# Patient Record
Sex: FEMALE | Race: WHITE | Employment: PART TIME | URBAN - METROPOLITAN AREA
[De-identification: names, ages, dates, MRNs, and addresses within clinical notes are randomized per-mention and may not be internally consistent; named-entity substitution may affect disease eponyms.]

---

## 2017-04-12 PROBLEM — L03.032 PARONYCHIA OF GREAT TOE, LEFT: Status: ACTIVE | Noted: 2017-04-12

## 2019-02-07 ENCOUNTER — HOSPITAL ENCOUNTER (INPATIENT)
Facility: HOSPITAL | Age: 49
LOS: 2 days | Discharge: HOME | DRG: 419 | End: 2019-02-09
Attending: HOSPITALIST | Admitting: HOSPITALIST
Payer: COMMERCIAL

## 2019-02-07 DIAGNOSIS — K81.0 ACUTE CHOLECYSTITIS: ICD-10-CM

## 2019-02-07 PROBLEM — K81.9 CHOLECYSTITIS: Status: ACTIVE | Noted: 2019-02-07

## 2019-02-07 RX ORDER — MORPHINE SULFATE 4 MG/ML
1 INJECTION, SOLUTION INTRAMUSCULAR; INTRAVENOUS EVERY 2 HOUR PRN
Status: DISCONTINUED | OUTPATIENT
Start: 2019-02-07 | End: 2019-02-08 | Stop reason: HOSPADM

## 2019-02-07 RX ORDER — MORPHINE SULFATE 4 MG/ML
2 INJECTION, SOLUTION INTRAMUSCULAR; INTRAVENOUS EVERY 2 HOUR PRN
Status: DISCONTINUED | OUTPATIENT
Start: 2019-02-07 | End: 2019-02-08 | Stop reason: HOSPADM

## 2019-02-07 RX ORDER — MORPHINE SULFATE 4 MG/ML
4 INJECTION, SOLUTION INTRAMUSCULAR; INTRAVENOUS EVERY 2 HOUR PRN
Status: DISCONTINUED | OUTPATIENT
Start: 2019-02-07 | End: 2019-02-08 | Stop reason: HOSPADM

## 2019-02-07 RX ORDER — ACETAMINOPHEN 325 MG/1
650 TABLET ORAL EVERY 6 HOURS PRN
Status: DISCONTINUED | OUTPATIENT
Start: 2019-02-07 | End: 2019-02-08 | Stop reason: HOSPADM

## 2019-02-07 RX ORDER — METOCLOPRAMIDE HYDROCHLORIDE 5 MG/ML
10 INJECTION INTRAMUSCULAR; INTRAVENOUS EVERY 8 HOURS PRN
Status: DISCONTINUED | OUTPATIENT
Start: 2019-02-07 | End: 2019-02-08 | Stop reason: HOSPADM

## 2019-02-07 RX ORDER — SODIUM CHLORIDE 9 MG/ML
INJECTION, SOLUTION INTRAVENOUS CONTINUOUS
Status: DISCONTINUED | OUTPATIENT
Start: 2019-02-07 | End: 2019-02-08 | Stop reason: HOSPADM

## 2019-02-07 RX ORDER — ONDANSETRON 2 MG/ML
4 INJECTION INTRAMUSCULAR; INTRAVENOUS EVERY 6 HOURS PRN
Status: DISCONTINUED | OUTPATIENT
Start: 2019-02-07 | End: 2019-02-08 | Stop reason: HOSPADM

## 2019-02-08 ENCOUNTER — ANESTHESIA EVENT (OUTPATIENT)
Dept: SURGERY | Facility: HOSPITAL | Age: 49
DRG: 419 | End: 2019-02-08
Payer: COMMERCIAL

## 2019-02-08 ENCOUNTER — APPOINTMENT (OUTPATIENT)
Dept: MRI IMAGING | Facility: HOSPITAL | Age: 49
DRG: 419 | End: 2019-02-08
Attending: INTERNAL MEDICINE
Payer: COMMERCIAL

## 2019-02-08 ENCOUNTER — ANESTHESIA (OUTPATIENT)
Dept: SURGERY | Facility: HOSPITAL | Age: 49
DRG: 419 | End: 2019-02-08
Payer: COMMERCIAL

## 2019-02-08 LAB
ALBUMIN SERPL-MCNC: 2.9 G/DL (ref 3.1–4.5)
ALBUMIN/GLOB SERPL: 0.9 {RATIO} (ref 1–2)
ALP LIVER SERPL-CCNC: 101 U/L (ref 39–100)
ALT SERPL-CCNC: 54 U/L (ref 14–54)
ANION GAP SERPL CALC-SCNC: 5 MMOL/L (ref 0–18)
AST SERPL-CCNC: 54 U/L (ref 15–41)
BASOPHILS # BLD AUTO: 0.02 X10(3) UL (ref 0–0.2)
BASOPHILS NFR BLD AUTO: 0.2 %
BILIRUB SERPL-MCNC: 1.4 MG/DL (ref 0.1–2)
BUN BLD-MCNC: 8 MG/DL (ref 8–20)
BUN/CREAT SERPL: 13.1 (ref 10–20)
CALCIUM BLD-MCNC: 8.2 MG/DL (ref 8.3–10.3)
CHLORIDE SERPL-SCNC: 111 MMOL/L (ref 101–111)
CO2 SERPL-SCNC: 24 MMOL/L (ref 22–32)
CREAT BLD-MCNC: 0.61 MG/DL (ref 0.55–1.02)
DEPRECATED RDW RBC AUTO: 44 FL (ref 35.1–46.3)
EOSINOPHIL # BLD AUTO: 0.11 X10(3) UL (ref 0–0.7)
EOSINOPHIL NFR BLD AUTO: 1 %
ERYTHROCYTE [DISTWIDTH] IN BLOOD BY AUTOMATED COUNT: 12.6 % (ref 11–15)
GLOBULIN PLAS-MCNC: 3.2 G/DL (ref 2.8–4.4)
GLUCOSE BLD-MCNC: 109 MG/DL (ref 70–99)
HCT VFR BLD AUTO: 36.5 % (ref 35–48)
HGB BLD-MCNC: 12.1 G/DL (ref 12–16)
IMM GRANULOCYTES # BLD AUTO: 0.06 X10(3) UL (ref 0–1)
IMM GRANULOCYTES NFR BLD: 0.5 %
LACTIC ACID: 0.8 MMOL/L (ref 0.5–2)
LYMPHOCYTES # BLD AUTO: 1.13 X10(3) UL (ref 1–4)
LYMPHOCYTES NFR BLD AUTO: 10.3 %
M PROTEIN MFR SERPL ELPH: 6.1 G/DL (ref 6.4–8.2)
MCH RBC QN AUTO: 32.1 PG (ref 26–34)
MCHC RBC AUTO-ENTMCNC: 33.2 G/DL (ref 31–37)
MCV RBC AUTO: 96.8 FL (ref 80–100)
MONOCYTES # BLD AUTO: 1 X10(3) UL (ref 0.1–1)
MONOCYTES NFR BLD AUTO: 9.1 %
NEUTROPHILS # BLD AUTO: 8.7 X10 (3) UL (ref 1.5–7.7)
NEUTROPHILS # BLD AUTO: 8.7 X10(3) UL (ref 1.5–7.7)
NEUTROPHILS NFR BLD AUTO: 78.9 %
OSMOLALITY SERPL CALC.SUM OF ELEC: 289 MOSM/KG (ref 275–295)
PLATELET # BLD AUTO: 189 10(3)UL (ref 150–450)
POTASSIUM SERPL-SCNC: 3.6 MMOL/L (ref 3.6–5.1)
POTASSIUM SERPL-SCNC: 3.6 MMOL/L (ref 3.6–5.1)
RBC # BLD AUTO: 3.77 X10(6)UL (ref 3.8–5.3)
SODIUM SERPL-SCNC: 140 MMOL/L (ref 136–144)
WBC # BLD AUTO: 11 X10(3) UL (ref 4–11)

## 2019-02-08 PROCEDURE — 88304 TISSUE EXAM BY PATHOLOGIST: CPT | Performed by: SURGERY

## 2019-02-08 PROCEDURE — 74183 MRI ABD W/O CNTR FLWD CNTR: CPT | Performed by: INTERNAL MEDICINE

## 2019-02-08 PROCEDURE — 85025 COMPLETE CBC W/AUTO DIFF WBC: CPT | Performed by: INTERNAL MEDICINE

## 2019-02-08 PROCEDURE — 76376 3D RENDER W/INTRP POSTPROCES: CPT | Performed by: INTERNAL MEDICINE

## 2019-02-08 PROCEDURE — 87040 BLOOD CULTURE FOR BACTERIA: CPT | Performed by: HOSPITALIST

## 2019-02-08 PROCEDURE — 0FT44ZZ RESECTION OF GALLBLADDER, PERCUTANEOUS ENDOSCOPIC APPROACH: ICD-10-PCS | Performed by: SURGERY

## 2019-02-08 PROCEDURE — A9575 INJ GADOTERATE MEGLUMI 0.1ML: HCPCS | Performed by: HOSPITALIST

## 2019-02-08 PROCEDURE — 80053 COMPREHEN METABOLIC PANEL: CPT | Performed by: INTERNAL MEDICINE

## 2019-02-08 PROCEDURE — 84132 ASSAY OF SERUM POTASSIUM: CPT | Performed by: HOSPITALIST

## 2019-02-08 PROCEDURE — 83605 ASSAY OF LACTIC ACID: CPT | Performed by: HOSPITALIST

## 2019-02-08 RX ORDER — SODIUM CHLORIDE, SODIUM LACTATE, POTASSIUM CHLORIDE, CALCIUM CHLORIDE 600; 310; 30; 20 MG/100ML; MG/100ML; MG/100ML; MG/100ML
INJECTION, SOLUTION INTRAVENOUS CONTINUOUS
Status: DISCONTINUED | OUTPATIENT
Start: 2019-02-08 | End: 2019-02-08

## 2019-02-08 RX ORDER — HYDROCODONE BITARTRATE AND ACETAMINOPHEN 5; 325 MG/1; MG/1
1 TABLET ORAL EVERY 4 HOURS PRN
Status: DISCONTINUED | OUTPATIENT
Start: 2019-02-08 | End: 2019-02-09

## 2019-02-08 RX ORDER — HYDROMORPHONE HYDROCHLORIDE 1 MG/ML
0.4 INJECTION, SOLUTION INTRAMUSCULAR; INTRAVENOUS; SUBCUTANEOUS EVERY 2 HOUR PRN
Status: DISCONTINUED | OUTPATIENT
Start: 2019-02-08 | End: 2019-02-09

## 2019-02-08 RX ORDER — HYDROMORPHONE HYDROCHLORIDE 1 MG/ML
INJECTION, SOLUTION INTRAMUSCULAR; INTRAVENOUS; SUBCUTANEOUS
Status: COMPLETED
Start: 2019-02-08 | End: 2019-02-08

## 2019-02-08 RX ORDER — LORAZEPAM 1 MG/1
1 TABLET ORAL ONCE AS NEEDED
Status: COMPLETED | OUTPATIENT
Start: 2019-02-08 | End: 2019-02-08

## 2019-02-08 RX ORDER — ONDANSETRON 2 MG/ML
4 INJECTION INTRAMUSCULAR; INTRAVENOUS AS NEEDED
Status: DISCONTINUED | OUTPATIENT
Start: 2019-02-08 | End: 2019-02-08 | Stop reason: HOSPADM

## 2019-02-08 RX ORDER — HYDROMORPHONE HYDROCHLORIDE 1 MG/ML
1.2 INJECTION, SOLUTION INTRAMUSCULAR; INTRAVENOUS; SUBCUTANEOUS EVERY 2 HOUR PRN
Status: DISCONTINUED | OUTPATIENT
Start: 2019-02-08 | End: 2019-02-09

## 2019-02-08 RX ORDER — HEPARIN SODIUM 5000 [USP'U]/ML
5000 INJECTION, SOLUTION INTRAVENOUS; SUBCUTANEOUS EVERY 12 HOURS SCHEDULED
Status: DISCONTINUED | OUTPATIENT
Start: 2019-02-08 | End: 2019-02-09

## 2019-02-08 RX ORDER — KETOROLAC TROMETHAMINE 15 MG/ML
15 INJECTION, SOLUTION INTRAMUSCULAR; INTRAVENOUS EVERY 6 HOURS PRN
Status: DISCONTINUED | OUTPATIENT
Start: 2019-02-08 | End: 2019-02-09

## 2019-02-08 RX ORDER — ACETAMINOPHEN 10 MG/ML
INJECTION, SOLUTION INTRAVENOUS
Status: DISCONTINUED | OUTPATIENT
Start: 2019-02-08 | End: 2019-02-08 | Stop reason: HOSPADM

## 2019-02-08 RX ORDER — HYDROMORPHONE HYDROCHLORIDE 1 MG/ML
0.8 INJECTION, SOLUTION INTRAMUSCULAR; INTRAVENOUS; SUBCUTANEOUS EVERY 2 HOUR PRN
Status: DISCONTINUED | OUTPATIENT
Start: 2019-02-08 | End: 2019-02-09

## 2019-02-08 RX ORDER — DEXAMETHASONE SODIUM PHOSPHATE 4 MG/ML
4 VIAL (ML) INJECTION AS NEEDED
Status: DISCONTINUED | OUTPATIENT
Start: 2019-02-08 | End: 2019-02-08 | Stop reason: HOSPADM

## 2019-02-08 RX ORDER — NALOXONE HYDROCHLORIDE 0.4 MG/ML
80 INJECTION, SOLUTION INTRAMUSCULAR; INTRAVENOUS; SUBCUTANEOUS AS NEEDED
Status: ACTIVE | OUTPATIENT
Start: 2019-02-08 | End: 2019-02-09

## 2019-02-08 RX ORDER — KETOROLAC TROMETHAMINE 30 MG/ML
30 INJECTION, SOLUTION INTRAMUSCULAR; INTRAVENOUS EVERY 6 HOURS PRN
Status: DISCONTINUED | OUTPATIENT
Start: 2019-02-08 | End: 2019-02-09

## 2019-02-08 RX ORDER — ONDANSETRON 2 MG/ML
4 INJECTION INTRAMUSCULAR; INTRAVENOUS EVERY 6 HOURS PRN
Status: DISCONTINUED | OUTPATIENT
Start: 2019-02-08 | End: 2019-02-09

## 2019-02-08 RX ORDER — BUPIVACAINE HYDROCHLORIDE AND EPINEPHRINE 5; 5 MG/ML; UG/ML
INJECTION, SOLUTION EPIDURAL; INTRACAUDAL; PERINEURAL AS NEEDED
Status: DISCONTINUED | OUTPATIENT
Start: 2019-02-08 | End: 2019-02-08 | Stop reason: HOSPADM

## 2019-02-08 RX ORDER — HYDROCODONE BITARTRATE AND ACETAMINOPHEN 5; 325 MG/1; MG/1
2 TABLET ORAL EVERY 4 HOURS PRN
Status: DISCONTINUED | OUTPATIENT
Start: 2019-02-08 | End: 2019-02-09

## 2019-02-08 RX ORDER — MIDAZOLAM HYDROCHLORIDE 1 MG/ML
1 INJECTION INTRAMUSCULAR; INTRAVENOUS EVERY 5 MIN PRN
Status: DISCONTINUED | OUTPATIENT
Start: 2019-02-08 | End: 2019-02-08 | Stop reason: HOSPADM

## 2019-02-08 RX ORDER — METOCLOPRAMIDE HYDROCHLORIDE 5 MG/ML
10 INJECTION INTRAMUSCULAR; INTRAVENOUS EVERY 6 HOURS PRN
Status: DISCONTINUED | OUTPATIENT
Start: 2019-02-08 | End: 2019-02-09

## 2019-02-08 RX ORDER — NALOXONE HYDROCHLORIDE 0.4 MG/ML
80 INJECTION, SOLUTION INTRAMUSCULAR; INTRAVENOUS; SUBCUTANEOUS AS NEEDED
Status: DISCONTINUED | OUTPATIENT
Start: 2019-02-08 | End: 2019-02-08 | Stop reason: HOSPADM

## 2019-02-08 RX ORDER — DIPHENHYDRAMINE HYDROCHLORIDE 50 MG/ML
12.5 INJECTION INTRAMUSCULAR; INTRAVENOUS AS NEEDED
Status: DISCONTINUED | OUTPATIENT
Start: 2019-02-08 | End: 2019-02-08 | Stop reason: HOSPADM

## 2019-02-08 RX ORDER — LABETALOL HYDROCHLORIDE 5 MG/ML
5 INJECTION, SOLUTION INTRAVENOUS EVERY 5 MIN PRN
Status: DISCONTINUED | OUTPATIENT
Start: 2019-02-08 | End: 2019-02-08 | Stop reason: HOSPADM

## 2019-02-08 RX ORDER — DEXTROSE, SODIUM CHLORIDE, AND POTASSIUM CHLORIDE 5; .45; .15 G/100ML; G/100ML; G/100ML
INJECTION INTRAVENOUS CONTINUOUS
Status: DISCONTINUED | OUTPATIENT
Start: 2019-02-08 | End: 2019-02-09

## 2019-02-08 RX ORDER — DIPHENHYDRAMINE HYDROCHLORIDE 12.5 MG/5ML
12.5 SOLUTION ORAL ONCE AS NEEDED
Status: COMPLETED | OUTPATIENT
Start: 2019-02-08 | End: 2019-02-08

## 2019-02-08 RX ORDER — HYDROMORPHONE HYDROCHLORIDE 1 MG/ML
0.4 INJECTION, SOLUTION INTRAMUSCULAR; INTRAVENOUS; SUBCUTANEOUS EVERY 5 MIN PRN
Status: DISCONTINUED | OUTPATIENT
Start: 2019-02-08 | End: 2019-02-08 | Stop reason: HOSPADM

## 2019-02-08 RX ORDER — SODIUM CHLORIDE, SODIUM LACTATE, POTASSIUM CHLORIDE, CALCIUM CHLORIDE 600; 310; 30; 20 MG/100ML; MG/100ML; MG/100ML; MG/100ML
INJECTION, SOLUTION INTRAVENOUS CONTINUOUS
Status: DISCONTINUED | OUTPATIENT
Start: 2019-02-08 | End: 2019-02-08 | Stop reason: HOSPADM

## 2019-02-08 NOTE — PLAN OF CARE
Pt is a&o x4. HR elevated. Low grade temperature of 100.8, tylenol given per [facility] with small sip of water. C/o pain at worst 7 out of 10. Providing pain meds as needed per [facility]. Maintained on NPO status, c/o nausea but no emesis.  Offering antiemetics as need

## 2019-02-08 NOTE — H&P
[facility] hospitalist H+P  PCP Britney Valenzuela MD  CC vomiting  HPI 50 yo female here with abdominal pain and nausea. Pain is epigastric, was severe now down to 2/10 after pain medication, radiating to RUQ. Dull, constant, at times sharp. No chest pain.  No emeiss at Yes        Partners: Male    Other Topics      Concerns:        Not on file    Social History Narrative      Not on file    All; No Known Allergies  Meds; No current facility-administered medications on file prior to encounter.    Current Outpatient [facility] cholecystitis  -IVF, NPO, IV Pain medication, IV Abx. Check blood culture, check lactic acid level   -GI and surgery consulted.  MRCP without filling defect    Hypokalemia; replace per protocol    Beth Rai MD  [facility]  [phone]

## 2019-02-08 NOTE — PAYOR COMM NOTE
--------------  CONTINUED STAY REVIEW    Payor: Melissa GUERIN/JEANCARLOS  Subscriber #:  [id number]  Authorization Number: [id number]    Admit date: 2/7/19  Admit time: 2138    Admitting Physician: Concetta Hopkins MD  Attending Physician:  Concetta Hopkins MD  [facility] pus.  The gallbladder was grasped and retracted over the liver edge. The cystic duct was identified clipped and divided. The cystic artery and its branches were identified clipped and divided.  The gallbladder was removed from the liver edge by taking down

## 2019-02-08 NOTE — CONSULTS
[facility]    Report of Gastroenterology Consultation    Jenna Elaine Patient Status:  Inpatient    1970 MRN [id number]   Location [facility] Attending Sumaya Joseph MD   Hosp Day # 1 PCP Ruby Jung MD     Date of Admi mg, 1 mg, Intravenous, Q2H PRN **OR** morphINE sulfate (PF) 4 MG/ML injection 2 mg, 2 mg, Intravenous, Q2H PRN **OR** morphINE sulfate (PF) 4 MG/ML injection 4 mg, 4 mg, Intravenous, Q2H PRN  •  Piperacillin Sod-Tazobactam So (ZOSYN) 3.375 g in dextrose 5 periods. Patient is not pregnant. Psychosocial: Denies history of mental illness, denies usually feeling lonely or depressed, denies history of depression, anxiety, history of physical or sexual abuse, stress, history of eating disorder.   Skin: Denies se 54 02/08/2019    ALT 54 02/08/2019       Imaging:      Assessment/Plan:    Biliary colic with cholelithiasis    Dilated CBD without stones.   There is a distal CBD stricture likely from passing stones or congenital     Proceed with cholecystectomy    Patien

## 2019-02-08 NOTE — PLAN OF CARE
NURSING ADMISSION NOTE      Patient admitted via Cart  Oriented to room. Safety precautions initiated. Bed in low position. Call light in reach. 2112-pt arrived to unit from immediate care in stable condition. In pain and c/o nausea.  Sent page to

## 2019-02-08 NOTE — ANESTHESIA PREPROCEDURE EVALUATION
PRE-OP EVALUATION    Patient Name: Bernard Bui    Pre-op Diagnosis: Acute cholecystitis [K81.0]    Procedure(s):  LAPAROSCOPIC CHOLECYSTECTOMY  WITH CHOLANGIOGRAM POSS.  OPEN    Surgeon(s) and Role:     Cristóbal Moreno MD - Primary    Pre-op vitals re 3   naproxen 500 MG Oral Tab 500mg bid 5 days prior menstrual cycle up to 10 days Disp: 20 tablet Rfl: 6   Magnesium 100 MG Oral Tab Take by mouth. Disp:  Rfl:    B Complex Vitamins (B COMPLEX 1 OR) Take by mouth.  Disp:  Rfl:    Cholecalciferol (VITAMIN D- 02/08/2019    RDW 12.3 02/07/2019    .0 02/08/2019     02/07/2019     Lab Results   Component Value Date     02/08/2019     (L) 02/07/2019    K 3.6 02/08/2019    K 3.6 02/08/2019    K 3.40 (L) 02/07/2019     02/08/2019    CL

## 2019-02-08 NOTE — ANESTHESIA POSTPROCEDURE EVALUATION
[address] [facility] Patient Status:  Inpatient   Age/Gender 51year old female MRN [id number]   Location [address] [facility] Attending Baltazar Ching MD   [facility] Day # 1 PCP Sophia Myers MD       Anesthesia Post-op Note

## 2019-02-08 NOTE — PLAN OF CARE
Problem: Patient/Family Goals  Goal: Patient/Family Short Term Goal  Patient's Short Term Goal: Prepare for discharge home    Interventions:   - Transition from IV to PO pain meds and antibiotics  - Advance diet as tolerated  -Encourage ambulation  - See a appropriate  Outcome: Progressing  k replaced with electrolyte protocol

## 2019-02-08 NOTE — OPERATIVE REPORT
Report of [address] Patient Status:  Inpatient    1970 MRN [id number]   [facility] SURGERY Attending Lucie Ventura MD   Hosp Day # 1 PCP removed from the liver edge by taking down its peritoneal attachments with hook electrocautery. Gallbladder was then delivered through the umbilical incision. All ports were re-introduced into the abdominal cavity.  Gallbladder fossa and right gutter was ir

## 2019-02-08 NOTE — CONSULTS
[facility]  Report of Consultation    Mariposa Gonzalez Patient Status:  Inpatient    1970 MRN [id number]   [facility] 3NW-A Attending Kevon Mclaughlin MD   [facility] Day # 1 PCP Ann Adamson MD     2019    Reason for Consultation:    Parvez Watkins Allergies    Current Medications:      Current Facility-Administered Medications:   •  LORazepam (ATIVAN) tab 1 mg, 1 mg, Oral, Once PRN  •  potassium chloride 40 mEq in sodium chloride 0.9% 250 mL IVPB, 40 mEq, Intravenous, Once  •  0.9%  NaCl infusion, , temperature 99.7 °F (37.6 °C), temperature source Oral, resp. rate 20, weight 214 lb 9.6 oz (97.3 kg), last menstrual period 08/01/2018, SpO2 97 %, not currently breastfeeding.     GENERAL: Alert and oriented x 3, well developed, well nourished female, in n INFORMATION:  Upper abdominal pain, unspecified COMPARISON STUDY:  None. TECHNIQUE: Realtime ultrasonography was performed of the gallbladder and static images obtained. FINDINGS:  Biliary System: No intra- or extrahepatic biliary ductal dilatation.   The c

## 2019-02-08 NOTE — PAYOR COMM NOTE
--------------  ADMISSION REVIEW     Payor: Raghavendra ISAACS  Subscriber #:  [id number]  Authorization Number: [id number]    Admit date: 2/7/19  Admit time: 2138       Admitting Physician: Viridiana López MD  Attending Physician:  Viridiana López MD  Primary drinks about 2.4 oz of alcohol per week.  She reports that she does not use drugs.     Allergies:     No Known Allergies     Current Medications:        Current Facility-Administered Medications:   •  LORazepam (ATIVAN) tab 1 mg, 1 mg, Oral, Once PRN  •  po positives and negatives per history of present illness.     Physical Exam:     Blood pressure 116/63, pulse 112, temperature 99.7 °F (37.6 °C), temperature source Oral, resp.  rate 20, weight 214 lb 9.6 oz (97.3 kg), last menstrual period 08/01/2018, SpO2 9 hemicolon.     Us Gallbladder (cpt=76705)     Result Date: 2/7/2019  DATE OF SERVICE: 02.07.2019 US GALLBLADDER (RIGHT UPPER QUADRANT ULTRASOUND) CLINICAL INFORMATION:  Upper abdominal pain, unspecified COMPARISON STUDY:  None.  TECHNIQUE: Realtime ultrason User    Admitted on 2/7/2019 2/7/2019 1820 New Bag 3 g Intravenous Heike Hayes, RN      Dicyclomine HCl (BENTYL) 10 MG/ML injection 2 mL     Date Action Dose Route User    Admitted on 2/7/2019 2/7/2019 1439 Given 2 mL Intramuscular (Left Upper Oute Action Dose Route User    2/8/2019 1039 New Bag 3.375 g Intravenous Eleni Bosworth, RN    2/7/2019 2232 New Bag 3.375 g Intravenous Anika Kulkarni RN      potassium chloride 40 mEq in sodium chloride 0.9% 250 mL IVPB     Date Action Dose Route User    2/7

## 2019-02-09 VITALS
WEIGHT: 214.63 LBS | OXYGEN SATURATION: 96 % | DIASTOLIC BLOOD PRESSURE: 63 MMHG | SYSTOLIC BLOOD PRESSURE: 116 MMHG | BODY MASS INDEX: 31 KG/M2 | TEMPERATURE: 98 F | HEART RATE: 91 BPM | RESPIRATION RATE: 18 BRPM

## 2019-02-09 LAB
ALBUMIN SERPL-MCNC: 2.6 G/DL (ref 3.1–4.5)
ALBUMIN/GLOB SERPL: 0.7 {RATIO} (ref 1–2)
ALP LIVER SERPL-CCNC: 100 U/L (ref 39–100)
ALT SERPL-CCNC: 78 U/L (ref 14–54)
ANION GAP SERPL CALC-SCNC: 7 MMOL/L (ref 0–18)
AST SERPL-CCNC: 75 U/L (ref 15–41)
BILIRUB SERPL-MCNC: 0.7 MG/DL (ref 0.1–2)
BUN BLD-MCNC: 7 MG/DL (ref 8–20)
BUN/CREAT SERPL: 12.3 (ref 10–20)
CALCIUM BLD-MCNC: 8.2 MG/DL (ref 8.3–10.3)
CHLORIDE SERPL-SCNC: 108 MMOL/L (ref 101–111)
CO2 SERPL-SCNC: 23 MMOL/L (ref 22–32)
CREAT BLD-MCNC: 0.57 MG/DL (ref 0.55–1.02)
DEPRECATED RDW RBC AUTO: 42.9 FL (ref 35.1–46.3)
ERYTHROCYTE [DISTWIDTH] IN BLOOD BY AUTOMATED COUNT: 12.4 % (ref 11–15)
GLOBULIN PLAS-MCNC: 3.5 G/DL (ref 2.8–4.4)
GLUCOSE BLD-MCNC: 141 MG/DL (ref 70–99)
HCT VFR BLD AUTO: 34.3 % (ref 35–48)
HGB BLD-MCNC: 11.7 G/DL (ref 12–16)
M PROTEIN MFR SERPL ELPH: 6.1 G/DL (ref 6.4–8.2)
MCH RBC QN AUTO: 33 PG (ref 26–34)
MCHC RBC AUTO-ENTMCNC: 34.1 G/DL (ref 31–37)
MCV RBC AUTO: 96.6 FL (ref 80–100)
OSMOLALITY SERPL CALC.SUM OF ELEC: 286 MOSM/KG (ref 275–295)
PLATELET # BLD AUTO: 175 10(3)UL (ref 150–450)
POTASSIUM SERPL-SCNC: 3.9 MMOL/L (ref 3.6–5.1)
RBC # BLD AUTO: 3.55 X10(6)UL (ref 3.8–5.3)
SODIUM SERPL-SCNC: 138 MMOL/L (ref 136–144)
WBC # BLD AUTO: 13.3 X10(3) UL (ref 4–11)

## 2019-02-09 PROCEDURE — 85027 COMPLETE CBC AUTOMATED: CPT | Performed by: SURGERY

## 2019-02-09 PROCEDURE — 80053 COMPREHEN METABOLIC PANEL: CPT | Performed by: SURGERY

## 2019-02-09 PROCEDURE — 90471 IMMUNIZATION ADMIN: CPT

## 2019-02-09 RX ORDER — AMOXICILLIN AND CLAVULANATE POTASSIUM 875; 125 MG/1; MG/1
1 TABLET, FILM COATED ORAL 2 TIMES DAILY
Qty: 20 TABLET | Refills: 0 | Status: SHIPPED | OUTPATIENT
Start: 2019-02-09 | End: 2019-02-19

## 2019-02-09 RX ORDER — BUTALBITAL, ACETAMINOPHEN AND CAFFEINE 50; 325; 40 MG/1; MG/1; MG/1
1 TABLET ORAL EVERY 6 HOURS PRN
Status: DISCONTINUED | OUTPATIENT
Start: 2019-02-09 | End: 2019-02-09

## 2019-02-09 RX ORDER — HYDROCODONE BITARTRATE AND ACETAMINOPHEN 5; 325 MG/1; MG/1
1-2 TABLET ORAL EVERY 6 HOURS PRN
Qty: 30 TABLET | Refills: 0 | Status: SHIPPED | OUTPATIENT
Start: 2019-02-09 | End: 2019-02-15

## 2019-02-09 NOTE — PROGRESS NOTES
Pt d/c home. D/c instructions given to pt, along with rx for norco, instructed to  rx for abx @ her pharmacy, stressed not to take fioricet/norco/tyl within 6 hours of each other, stressed not to take naproxin & motrin within 6 hours of eachother.

## 2019-02-09 NOTE — PROGRESS NOTES
Pt with migraine through night that got increasingly worse, no improvement after norco, pt tearful and c/o nausea. Zofran given. Pt with hx of migraines and takes fioricet at home. Dr Kwabena Malcolm paged, orders received and pt given fioricet as ordered.  Please see

## 2019-02-09 NOTE — PLAN OF CARE
Problem: Patient/Family Goals  Goal: Patient/Family Short Term Goal  Patient's Short Term Goal: Prepare for discharge home    Interventions:   - Transition from IV to PO pain meds and antibiotics  - Advance diet as tolerated  -Encourage ambulation  - See a results as appropriate  - Fluid restriction as ordered  - Instruct patient on fluid and nutrition restrictions as appropriate  Outcome: Adequate for Discharge  Lytes wnl

## 2019-02-09 NOTE — PROGRESS NOTES
[facility]  Progress Note    Urban Jaime Patient Status:  Inpatient    1970 MRN [id number]   [facility] 3NW-A Attending Daphnie Nelson MD   [facility] Day # 2 PCP Margaret Bashir MD     Subjective:    Feeling better this morning.

## 2019-02-09 NOTE — PROGRESS NOTES
Returned from [facility] @ this time. A&o x4. Lap sites x 4 cdi w/ ss. Reports thirsty. Denies c/o pain @ this time.

## 2019-02-09 NOTE — PROGRESS NOTES
[facility] Hospitalist Progress Note     PCP: Vilma Arce MD    Chief Complaint: follow-up    Overnight/Interim Events:      SUBJECTIVE:  PT LAYING IN BED. HAVING MIGRAINE. Getting a little better. Hasn't walked yet today. Some flatus, using IS.  Tolerating solid Butalbital-APAP-Caffeine, HYDROmorphone HCl **OR** HYDROmorphone HCl **OR** HYDROmorphone HCl, ondansetron HCl, Metoclopramide HCl, HYDROcodone-acetaminophen **OR** HYDROcodone-acetaminophen, ketorolac (TORADOL) injection **OR** ketorolac (TORADOL) inj

## 2019-02-11 NOTE — PAYOR COMM NOTE
--------------  DISCHARGE REVIEW    Payor: Joe GUERIN/JEANCARLOS  Subscriber #:  [id number]  Authorization Number: [id number]    Admit date: 2/7/19  Admit time:  2138  Discharge Date: 2/9/2019  3:16 PM     Admitting Physician: Jena Zavala MD  Attending Phys [id number]   ALT  52  54  78*   AST  34  54*  75*   ALB  3.4*  2.9*  2.6*         No results for input(s): PGLU in the last 168 hours.     No results for input(s): TROP in the last 168 hours.        Meds:      • Heparin Sodium (Porcine)  5,000 Units Subcutaneous

## 2019-03-02 ENCOUNTER — APPOINTMENT (OUTPATIENT)
Dept: MRI IMAGING | Facility: HOSPITAL | Age: 49
DRG: 446 | End: 2019-03-02
Attending: SURGERY
Payer: COMMERCIAL

## 2019-03-02 ENCOUNTER — HOSPITAL ENCOUNTER (INPATIENT)
Facility: HOSPITAL | Age: 49
LOS: 3 days | Discharge: HOME | DRG: 446 | End: 2019-03-05
Attending: EMERGENCY MEDICINE | Admitting: INTERNAL MEDICINE
Payer: COMMERCIAL

## 2019-03-02 ENCOUNTER — APPOINTMENT (OUTPATIENT)
Dept: NUCLEAR MEDICINE | Facility: HOSPITAL | Age: 49
DRG: 446 | End: 2019-03-02
Attending: SURGERY
Payer: COMMERCIAL

## 2019-03-02 ENCOUNTER — APPOINTMENT (OUTPATIENT)
Dept: CT IMAGING | Facility: HOSPITAL | Age: 49
DRG: 446 | End: 2019-03-02
Attending: EMERGENCY MEDICINE
Payer: COMMERCIAL

## 2019-03-02 DIAGNOSIS — R10.13 ABDOMINAL PAIN, EPIGASTRIC: Primary | ICD-10-CM

## 2019-03-02 LAB
ALBUMIN SERPL-MCNC: 3.7 G/DL (ref 3.4–5)
ALBUMIN/GLOB SERPL: 1.1 {RATIO} (ref 1–2)
ALP LIVER SERPL-CCNC: 111 U/L (ref 39–100)
ALT SERPL-CCNC: 37 U/L (ref 13–56)
ANION GAP SERPL CALC-SCNC: 9 MMOL/L (ref 0–18)
AST SERPL-CCNC: 37 U/L (ref 15–37)
ATRIAL RATE: 77 BPM
BASOPHILS # BLD AUTO: 0.05 X10(3) UL (ref 0–0.2)
BASOPHILS NFR BLD AUTO: 0.6 %
BILIRUB SERPL-MCNC: 0.5 MG/DL (ref 0.1–2)
BILIRUB UR QL STRIP.AUTO: NEGATIVE
BUN BLD-MCNC: 11 MG/DL (ref 7–18)
BUN/CREAT SERPL: 17.2 (ref 10–20)
CALCIUM BLD-MCNC: 9.3 MG/DL (ref 8.5–10.1)
CHLORIDE SERPL-SCNC: 109 MMOL/L (ref 98–107)
CLARITY UR REFRACT.AUTO: CLEAR
CO2 SERPL-SCNC: 24 MMOL/L (ref 21–32)
COLOR UR AUTO: YELLOW
CREAT BLD-MCNC: 0.64 MG/DL (ref 0.55–1.02)
DEPRECATED RDW RBC AUTO: 42.1 FL (ref 35.1–46.3)
EOSINOPHIL # BLD AUTO: 0.4 X10(3) UL (ref 0–0.7)
EOSINOPHIL NFR BLD AUTO: 5.1 %
ERYTHROCYTE [DISTWIDTH] IN BLOOD BY AUTOMATED COUNT: 12.1 % (ref 11–15)
GLOBULIN PLAS-MCNC: 3.5 G/DL (ref 2.8–4.4)
GLUCOSE BLD-MCNC: 118 MG/DL (ref 70–99)
GLUCOSE UR STRIP.AUTO-MCNC: NEGATIVE MG/DL
HCT VFR BLD AUTO: 40.8 % (ref 35–48)
HGB BLD-MCNC: 14.2 G/DL (ref 12–16)
IMM GRANULOCYTES # BLD AUTO: 0.03 X10(3) UL (ref 0–1)
IMM GRANULOCYTES NFR BLD: 0.4 %
KETONES UR STRIP.AUTO-MCNC: NEGATIVE MG/DL
LIPASE SERPL-CCNC: 135 U/L (ref 73–393)
LYMPHOCYTES # BLD AUTO: 2.96 X10(3) UL (ref 1–4)
LYMPHOCYTES NFR BLD AUTO: 37.8 %
M PROTEIN MFR SERPL ELPH: 7.2 G/DL (ref 6.4–8.2)
MCH RBC QN AUTO: 32.9 PG (ref 26–34)
MCHC RBC AUTO-ENTMCNC: 34.8 G/DL (ref 31–37)
MCV RBC AUTO: 94.7 FL (ref 80–100)
MONOCYTES # BLD AUTO: 0.69 X10(3) UL (ref 0.1–1)
MONOCYTES NFR BLD AUTO: 8.8 %
NEUTROPHILS # BLD AUTO: 3.7 X10 (3) UL (ref 1.5–7.7)
NEUTROPHILS # BLD AUTO: 3.7 X10(3) UL (ref 1.5–7.7)
NEUTROPHILS NFR BLD AUTO: 47.3 %
NITRITE UR QL STRIP.AUTO: NEGATIVE
OSMOLALITY SERPL CALC.SUM OF ELEC: 294 MOSM/KG (ref 275–295)
P AXIS: 54 DEGREES
P-R INTERVAL: 154 MS
PH UR STRIP.AUTO: 5 [PH] (ref 4.5–8)
PLATELET # BLD AUTO: 288 10(3)UL (ref 150–450)
POCT LOT NUMBER: NORMAL
POCT URINE PREGNANCY: NEGATIVE
POTASSIUM SERPL-SCNC: 4.1 MMOL/L (ref 3.5–5.1)
PROT UR STRIP.AUTO-MCNC: NEGATIVE MG/DL
Q-T INTERVAL: 396 MS
QRS DURATION: 78 MS
QTC CALCULATION (BEZET): 448 MS
R AXIS: 21 DEGREES
RBC # BLD AUTO: 4.31 X10(6)UL (ref 3.8–5.3)
SODIUM SERPL-SCNC: 142 MMOL/L (ref 136–145)
SP GR UR STRIP.AUTO: 1.02 (ref 1–1.03)
T AXIS: 53 DEGREES
UROBILINOGEN UR STRIP.AUTO-MCNC: <2 MG/DL
VENTRICULAR RATE: 77 BPM
WBC # BLD AUTO: 7.8 X10(3) UL (ref 4–11)

## 2019-03-02 PROCEDURE — 99285 EMERGENCY DEPT VISIT HI MDM: CPT

## 2019-03-02 PROCEDURE — 96361 HYDRATE IV INFUSION ADD-ON: CPT

## 2019-03-02 PROCEDURE — C9113 INJ PANTOPRAZOLE SODIUM, VIA: HCPCS | Performed by: SURGERY

## 2019-03-02 PROCEDURE — 96376 TX/PRO/DX INJ SAME DRUG ADON: CPT

## 2019-03-02 PROCEDURE — 78299 UNLISTED GI PX DX NUC MED: CPT | Performed by: SURGERY

## 2019-03-02 PROCEDURE — 74181 MRI ABDOMEN W/O CONTRAST: CPT | Performed by: SURGERY

## 2019-03-02 PROCEDURE — 80053 COMPREHEN METABOLIC PANEL: CPT | Performed by: EMERGENCY MEDICINE

## 2019-03-02 PROCEDURE — 78226 HEPATOBILIARY SYSTEM IMAGING: CPT | Performed by: SURGERY

## 2019-03-02 PROCEDURE — 81025 URINE PREGNANCY TEST: CPT

## 2019-03-02 PROCEDURE — 87086 URINE CULTURE/COLONY COUNT: CPT | Performed by: EMERGENCY MEDICINE

## 2019-03-02 PROCEDURE — 93005 ELECTROCARDIOGRAM TRACING: CPT

## 2019-03-02 PROCEDURE — 83690 ASSAY OF LIPASE: CPT | Performed by: EMERGENCY MEDICINE

## 2019-03-02 PROCEDURE — 81001 URINALYSIS AUTO W/SCOPE: CPT | Performed by: EMERGENCY MEDICINE

## 2019-03-02 PROCEDURE — 96374 THER/PROPH/DIAG INJ IV PUSH: CPT

## 2019-03-02 PROCEDURE — 96375 TX/PRO/DX INJ NEW DRUG ADDON: CPT

## 2019-03-02 PROCEDURE — 93010 ELECTROCARDIOGRAM REPORT: CPT

## 2019-03-02 PROCEDURE — 74177 CT ABD & PELVIS W/CONTRAST: CPT | Performed by: EMERGENCY MEDICINE

## 2019-03-02 PROCEDURE — 85025 COMPLETE CBC W/AUTO DIFF WBC: CPT | Performed by: EMERGENCY MEDICINE

## 2019-03-02 RX ORDER — SODIUM CHLORIDE 9 MG/ML
INJECTION, SOLUTION INTRAVENOUS CONTINUOUS
Status: DISCONTINUED | OUTPATIENT
Start: 2019-03-02 | End: 2019-03-05

## 2019-03-02 RX ORDER — ONDANSETRON 2 MG/ML
4 INJECTION INTRAMUSCULAR; INTRAVENOUS ONCE
Status: COMPLETED | OUTPATIENT
Start: 2019-03-02 | End: 2019-03-02

## 2019-03-02 RX ORDER — LORAZEPAM 2 MG/ML
0.5 INJECTION INTRAMUSCULAR ONCE
Status: COMPLETED | OUTPATIENT
Start: 2019-03-02 | End: 2019-03-02

## 2019-03-02 RX ORDER — HYDROMORPHONE HYDROCHLORIDE 1 MG/ML
0.5 INJECTION, SOLUTION INTRAMUSCULAR; INTRAVENOUS; SUBCUTANEOUS EVERY 30 MIN PRN
Status: ACTIVE | OUTPATIENT
Start: 2019-03-02 | End: 2019-03-02

## 2019-03-02 RX ORDER — MORPHINE SULFATE 4 MG/ML
1 INJECTION, SOLUTION INTRAMUSCULAR; INTRAVENOUS EVERY 2 HOUR PRN
Status: DISCONTINUED | OUTPATIENT
Start: 2019-03-02 | End: 2019-03-05

## 2019-03-02 RX ORDER — MORPHINE SULFATE 4 MG/ML
2 INJECTION, SOLUTION INTRAMUSCULAR; INTRAVENOUS EVERY 2 HOUR PRN
Status: DISCONTINUED | OUTPATIENT
Start: 2019-03-02 | End: 2019-03-05

## 2019-03-02 RX ORDER — HYDROMORPHONE HYDROCHLORIDE 1 MG/ML
1 INJECTION, SOLUTION INTRAMUSCULAR; INTRAVENOUS; SUBCUTANEOUS ONCE
Status: COMPLETED | OUTPATIENT
Start: 2019-03-02 | End: 2019-03-02

## 2019-03-02 RX ORDER — MORPHINE SULFATE 4 MG/ML
4 INJECTION, SOLUTION INTRAMUSCULAR; INTRAVENOUS EVERY 2 HOUR PRN
Status: DISCONTINUED | OUTPATIENT
Start: 2019-03-02 | End: 2019-03-05

## 2019-03-02 RX ORDER — HYDROMORPHONE HYDROCHLORIDE 1 MG/ML
0.5 INJECTION, SOLUTION INTRAMUSCULAR; INTRAVENOUS; SUBCUTANEOUS ONCE
Status: COMPLETED | OUTPATIENT
Start: 2019-03-02 | End: 2019-03-02

## 2019-03-02 RX ORDER — HYDROCODONE BITARTRATE AND ACETAMINOPHEN 5; 325 MG/1; MG/1
2 TABLET ORAL EVERY 4 HOURS PRN
Status: DISCONTINUED | OUTPATIENT
Start: 2019-03-02 | End: 2019-03-05

## 2019-03-02 RX ORDER — LORAZEPAM 2 MG/ML
1 INJECTION INTRAMUSCULAR ONCE
Status: COMPLETED | OUTPATIENT
Start: 2019-03-02 | End: 2019-03-02

## 2019-03-02 RX ORDER — ACETAMINOPHEN 325 MG/1
650 TABLET ORAL EVERY 4 HOURS PRN
Status: DISCONTINUED | OUTPATIENT
Start: 2019-03-02 | End: 2019-03-05

## 2019-03-02 RX ORDER — HEPARIN SODIUM 5000 [USP'U]/ML
5000 INJECTION, SOLUTION INTRAVENOUS; SUBCUTANEOUS EVERY 12 HOURS SCHEDULED
Status: DISCONTINUED | OUTPATIENT
Start: 2019-03-03 | End: 2019-03-05

## 2019-03-02 RX ORDER — ONDANSETRON 2 MG/ML
4 INJECTION INTRAMUSCULAR; INTRAVENOUS EVERY 4 HOURS PRN
Status: DISCONTINUED | OUTPATIENT
Start: 2019-03-02 | End: 2019-03-05

## 2019-03-02 RX ORDER — SODIUM CHLORIDE 9 MG/ML
INJECTION, SOLUTION INTRAVENOUS CONTINUOUS
Status: ACTIVE | OUTPATIENT
Start: 2019-03-02 | End: 2019-03-02

## 2019-03-02 RX ORDER — HYDROCODONE BITARTRATE AND ACETAMINOPHEN 5; 325 MG/1; MG/1
1 TABLET ORAL EVERY 4 HOURS PRN
Status: DISCONTINUED | OUTPATIENT
Start: 2019-03-02 | End: 2019-03-05

## 2019-03-02 RX ORDER — ONDANSETRON 2 MG/ML
4 INJECTION INTRAMUSCULAR; INTRAVENOUS EVERY 6 HOURS PRN
Status: DISCONTINUED | OUTPATIENT
Start: 2019-03-02 | End: 2019-03-05

## 2019-03-02 RX ORDER — METOCLOPRAMIDE HYDROCHLORIDE 5 MG/ML
10 INJECTION INTRAMUSCULAR; INTRAVENOUS EVERY 8 HOURS PRN
Status: DISCONTINUED | OUTPATIENT
Start: 2019-03-02 | End: 2019-03-05

## 2019-03-02 NOTE — ED INITIAL ASSESSMENT (HPI)
Pt to er with family c.c. abd pain for past  30 min prior to arrival no vomiting or diarrhea pt had gallbladder surgery 2/8/19 at [facility]. No complications.

## 2019-03-02 NOTE — CONSULTS
[facility]  Report of Consultation    Maren Santos Patient Status:  Observation    1970 MRN [id number]   [facility] 3NW-A Attending Jhonatan Springer MD   Hosp Day # 0 PCP Vilma Arce MD     Reason for Consultation: Any Sotomayor mg, 4 mg, Intravenous, Q4H PRN  •  0.9%  NaCl infusion, , Intravenous, Continuous  •  [START ON 3/3/2019] Heparin Sodium (Porcine) 5000 UNIT/ML injection 5,000 Units, 5,000 Units, Subcutaneous, 2 times per day  •  acetaminophen (TYLENOL) tab 650 mg, 650 mg bilaterally. Cardiac: Regular rate and rhythm. No murmur. Abdomen:  Tender in the epigastric but no peritonitis  Extremities:  No lower extremity edema noted. Without clubbing or cyanosis. Skin: Normal texture and turgor.   Neurologic: Cranial nerves enlargement. AORTA/VASCULAR:  No aneurysm or dissection. RETROPERITONEUM:  No mass or adenopathy. BOWEL/MESENTERY:  No visible mass, obstruction, or bowel wall thickening. ABDOMINAL WALL:  No mass or hernia.     URINARY BLADDER:  No visible foca

## 2019-03-02 NOTE — PLAN OF CARE
NURSING ADMISSION NOTE      Patient admitted via Cart  Oriented to room. Safety precautions initiated. Bed in low position. Call light in reach. Pt arrived to unit in stable condition. Alert and oriented x4. VSS and afebrile.  O2 sats WNL on room

## 2019-03-02 NOTE — H&P
General Medicine H&P     Patient presents with:  Abdomen/Flank Pain (GI/)  Postop/Procedure Problem       PCP: Henry Cerda MD    Attempted to see earlier at [facility]    History of Present Illness: Patient is a 51year old female with PMH including but not dean above. \    OBJECTIVE:  /72 (BP Location: Right arm)   Pulse 71   Temp 98.8 °F (37.1 °C) (Oral)   Resp 16   Ht 5' 10\" (1.778 m)   Wt 208 lb (94.3 kg)   LMP 03/02/2019   SpO2 97%   BMI 29.84 kg/m²     Gen: NAD, A+O x 3  Neck: supple, no LAD  CV: rrr, gallbladder and static images obtained. FINDINGS:  Biliary System: No intra- or extrahepatic biliary ductal dilatation. The common bile duct measures 8 mm at the chandra hepatis.  Gallbladder: Sludge is present, and there appears to be an approximately 1 cm lesion, or calculus. PELVIC NODES:  No adenopathy. PELVIC ORGANS:  Cystic changes are seen within the left ovary measuring up to 2.5 cm. BONES:  Disc space narrowing and spurring at L5-S1 with mild neural foraminal encroachment at this level.   LUNG BASE lesion. BILIARY:  The common bile duct measures 14 mm in diameter. Gallbladder wall thickening with pericholecystic fluid and adjacent inflammatory changes are noted. Free fluid in the right upper quadrant a surrounding this region is identified.   No fi the common bile duct and of the proximal small bowel which is visualized at approximately 31 min. Distention of the extrahepatic common duct is noted. This may be postprocedural in nature.   No evidence of complete obstruction as radiotracer is also seen

## 2019-03-02 NOTE — ED PROVIDER NOTES
Patient Seen in: [facility] Emergency Department    History   Patient presents with:  Abdomen/Flank Pain (GI/)  Postop/Procedure Problem    Stated Complaint: abdominal pain, post gallbladder surgery x3 weeks prior    HPI    79-year-old female who post gallbladder surgery x3 weeks prior  Other systems are as noted in HPI. Constitutional and vital signs reviewed. All other systems reviewed and negative except as noted above.     Physical Exam     ED Triage Vitals   BP 03/02/19 0349 126/89   Puls DIFFERENTIAL WITH PLATELET.   Procedure                               Abnormality         Status                     ---------                               -----------         ------                     CBC W/ DIFFERENTIAL[[id number]] with Dr. Naa Regalado, general surgeon, patient will be admitted for further evaluation and treatment. Antibiotics held as discussed with general surgeon as etiology is unclear at this time. Patient was also discussed with the [facility] hospitalist physician.   [place]

## 2019-03-03 LAB
ALBUMIN SERPL-MCNC: 2.9 G/DL (ref 3.4–5)
ALP LIVER SERPL-CCNC: 119 U/L (ref 39–100)
ALT SERPL-CCNC: 182 U/L (ref 13–56)
ANION GAP SERPL CALC-SCNC: 6 MMOL/L (ref 0–18)
AST SERPL-CCNC: 174 U/L (ref 15–37)
BASOPHILS # BLD AUTO: 0.02 X10(3) UL (ref 0–0.2)
BASOPHILS NFR BLD AUTO: 0.4 %
BILIRUB DIRECT SERPL-MCNC: 0.2 MG/DL (ref 0–0.2)
BILIRUB SERPL-MCNC: 1.1 MG/DL (ref 0.1–2)
BUN BLD-MCNC: 9 MG/DL (ref 7–18)
BUN/CREAT SERPL: 15.3 (ref 10–20)
CALCIUM BLD-MCNC: 8.3 MG/DL (ref 8.5–10.1)
CHLORIDE SERPL-SCNC: 110 MMOL/L (ref 98–107)
CO2 SERPL-SCNC: 26 MMOL/L (ref 21–32)
CREAT BLD-MCNC: 0.59 MG/DL (ref 0.55–1.02)
DEPRECATED RDW RBC AUTO: 43 FL (ref 35.1–46.3)
EOSINOPHIL # BLD AUTO: 0.28 X10(3) UL (ref 0–0.7)
EOSINOPHIL NFR BLD AUTO: 5.9 %
ERYTHROCYTE [DISTWIDTH] IN BLOOD BY AUTOMATED COUNT: 12.3 % (ref 11–15)
GLUCOSE BLD-MCNC: 89 MG/DL (ref 70–99)
HCT VFR BLD AUTO: 35.9 % (ref 35–48)
HGB BLD-MCNC: 12.2 G/DL (ref 12–16)
IMM GRANULOCYTES # BLD AUTO: 0.01 X10(3) UL (ref 0–1)
IMM GRANULOCYTES NFR BLD: 0.2 %
LYMPHOCYTES # BLD AUTO: 1.34 X10(3) UL (ref 1–4)
LYMPHOCYTES NFR BLD AUTO: 28.5 %
M PROTEIN MFR SERPL ELPH: 5.8 G/DL (ref 6.4–8.2)
MCH RBC QN AUTO: 32.8 PG (ref 26–34)
MCHC RBC AUTO-ENTMCNC: 34 G/DL (ref 31–37)
MCV RBC AUTO: 96.5 FL (ref 80–100)
MONOCYTES # BLD AUTO: 0.4 X10(3) UL (ref 0.1–1)
MONOCYTES NFR BLD AUTO: 8.5 %
NEUTROPHILS # BLD AUTO: 2.66 X10 (3) UL (ref 1.5–7.7)
NEUTROPHILS # BLD AUTO: 2.66 X10(3) UL (ref 1.5–7.7)
NEUTROPHILS NFR BLD AUTO: 56.5 %
OSMOLALITY SERPL CALC.SUM OF ELEC: 292 MOSM/KG (ref 275–295)
PLATELET # BLD AUTO: 185 10(3)UL (ref 150–450)
POTASSIUM SERPL-SCNC: 3.6 MMOL/L (ref 3.5–5.1)
RBC # BLD AUTO: 3.72 X10(6)UL (ref 3.8–5.3)
SODIUM SERPL-SCNC: 142 MMOL/L (ref 136–145)
WBC # BLD AUTO: 4.7 X10(3) UL (ref 4–11)

## 2019-03-03 PROCEDURE — 80076 HEPATIC FUNCTION PANEL: CPT | Performed by: SURGERY

## 2019-03-03 PROCEDURE — 85025 COMPLETE CBC W/AUTO DIFF WBC: CPT | Performed by: HOSPITALIST

## 2019-03-03 PROCEDURE — 80048 BASIC METABOLIC PNL TOTAL CA: CPT | Performed by: HOSPITALIST

## 2019-03-03 PROCEDURE — C9113 INJ PANTOPRAZOLE SODIUM, VIA: HCPCS | Performed by: SURGERY

## 2019-03-03 NOTE — CONSULTS
[facility] Patient Status:  Inpatient    1970 MRN [id number]   [facility] 3NW-A Attending Jacquie Mccord MD   [address] [facility] [address] Day # 1 PCP Aylin Perez MD       Lalitha Ly is a 51year old female for epiga Never Smoker      Smokeless tobacco: Never Used    Alcohol use:  Yes      Alcohol/week: 2.4 oz      Types: 4 Glasses of wine per week      Comment: glass of wine w/ dinner     Drug use: No    Occupation:    FAMILY HX: GI HX: None, no hx of colon cancer  Fam

## 2019-03-03 NOTE — PROGRESS NOTES
3/3/19 Consent for EUS and ERCP given to pt for review per order. Pt states she will call writer when she is ready to sign consent after her shower. No questions from pt regarding procedures/ testing at this time. Family at bedside. Cont to monitor.

## 2019-03-03 NOTE — PROGRESS NOTES
[facility]  Progress Note    Classeric Emanuel Patient Status:  Inpatient    1970 MRN [id number]   [facility] 3NW-A Attending Samina Zelaya MD   [address] Day # 1 PCP Inna Cowart MD     Subjective:  Patient is awake and feeling

## 2019-03-03 NOTE — PLAN OF CARE
Pt is a&o x4. VSS and afebrile. Sinus rhythm on tele, rate in 80s. O2 sats WNL on room air. Lung sounds clear bilaterally. C/o moderate abdominal pain, offering pain meds as needed per MAR. Abdomen is soft, nondistended and tender.  Bowel sounds hypoactive

## 2019-03-03 NOTE — PROGRESS NOTES
[facility] Hospitalist Progress Note     PCP: Lucy Manuel MD    Chief Complaint: follow-up    Overnight/Interim Events:      SUBJECTIVE:  Pt laying in bed. , just walked to bathroom. No pain/n/v.  Has HA initially felt from not eating    OBJECTIVE:  Temp: morphINE sulfate **OR** morphINE sulfate, ondansetron HCl, Metoclopramide HCl, Prochlorperazine Edisylate       Assessment/Plan:     51year old female with PMH including but not limited to migraine, goiter who p/t [facility] ED c epigastric tenderness.      Sumaya Watkins

## 2019-03-04 ENCOUNTER — ANESTHESIA (OUTPATIENT)
Dept: ENDOSCOPY | Facility: HOSPITAL | Age: 49
End: 2019-03-04

## 2019-03-04 ENCOUNTER — ANESTHESIA EVENT (OUTPATIENT)
Dept: ENDOSCOPY | Facility: HOSPITAL | Age: 49
End: 2019-03-04

## 2019-03-04 LAB
ALBUMIN SERPL-MCNC: 3 G/DL (ref 3.4–5)
ALP LIVER SERPL-CCNC: 110 U/L (ref 39–100)
ALT SERPL-CCNC: 120 U/L (ref 13–56)
AST SERPL-CCNC: 70 U/L (ref 15–37)
BILIRUB DIRECT SERPL-MCNC: 0.2 MG/DL (ref 0–0.2)
BILIRUB SERPL-MCNC: 0.7 MG/DL (ref 0.1–2)
M PROTEIN MFR SERPL ELPH: 5.9 G/DL (ref 6.4–8.2)
PLATELET # BLD AUTO: 201 10(3)UL (ref 150–450)
POTASSIUM SERPL-SCNC: 3.5 MMOL/L (ref 3.5–5.1)

## 2019-03-04 PROCEDURE — 85049 AUTOMATED PLATELET COUNT: CPT | Performed by: INTERNAL MEDICINE

## 2019-03-04 PROCEDURE — 84132 ASSAY OF SERUM POTASSIUM: CPT | Performed by: INTERNAL MEDICINE

## 2019-03-04 PROCEDURE — 0DJ08ZZ INSPECTION OF UPPER INTESTINAL TRACT, VIA NATURAL OR ARTIFICIAL OPENING ENDOSCOPIC: ICD-10-PCS | Performed by: INTERNAL MEDICINE

## 2019-03-04 PROCEDURE — C9113 INJ PANTOPRAZOLE SODIUM, VIA: HCPCS | Performed by: SURGERY

## 2019-03-04 PROCEDURE — 80076 HEPATIC FUNCTION PANEL: CPT | Performed by: INTERNAL MEDICINE

## 2019-03-04 RX ORDER — KETOROLAC TROMETHAMINE 15 MG/ML
15 INJECTION, SOLUTION INTRAMUSCULAR; INTRAVENOUS EVERY 6 HOURS PRN
Status: DISCONTINUED | OUTPATIENT
Start: 2019-03-04 | End: 2019-03-05

## 2019-03-04 RX ORDER — HYDROCODONE BITARTRATE AND ACETAMINOPHEN 5; 325 MG/1; MG/1
1 TABLET ORAL AS NEEDED
Status: DISCONTINUED | OUTPATIENT
Start: 2019-03-04 | End: 2019-03-04 | Stop reason: HOSPADM

## 2019-03-04 RX ORDER — KETOROLAC TROMETHAMINE 30 MG/ML
30 INJECTION, SOLUTION INTRAMUSCULAR; INTRAVENOUS EVERY 6 HOURS PRN
Status: DISCONTINUED | OUTPATIENT
Start: 2019-03-04 | End: 2019-03-05

## 2019-03-04 RX ORDER — NALOXONE HYDROCHLORIDE 0.4 MG/ML
80 INJECTION, SOLUTION INTRAMUSCULAR; INTRAVENOUS; SUBCUTANEOUS AS NEEDED
Status: DISCONTINUED | OUTPATIENT
Start: 2019-03-04 | End: 2019-03-04 | Stop reason: HOSPADM

## 2019-03-04 RX ORDER — METOCLOPRAMIDE HYDROCHLORIDE 5 MG/ML
10 INJECTION INTRAMUSCULAR; INTRAVENOUS AS NEEDED
Status: DISCONTINUED | OUTPATIENT
Start: 2019-03-04 | End: 2019-03-04 | Stop reason: HOSPADM

## 2019-03-04 RX ORDER — MEPERIDINE HYDROCHLORIDE 25 MG/ML
12.5 INJECTION INTRAMUSCULAR; INTRAVENOUS; SUBCUTANEOUS AS NEEDED
Status: DISCONTINUED | OUTPATIENT
Start: 2019-03-04 | End: 2019-03-04 | Stop reason: HOSPADM

## 2019-03-04 RX ORDER — SODIUM CHLORIDE, SODIUM LACTATE, POTASSIUM CHLORIDE, CALCIUM CHLORIDE 600; 310; 30; 20 MG/100ML; MG/100ML; MG/100ML; MG/100ML
INJECTION, SOLUTION INTRAVENOUS CONTINUOUS
Status: DISCONTINUED | OUTPATIENT
Start: 2019-03-04 | End: 2019-03-05

## 2019-03-04 RX ORDER — DIPHENHYDRAMINE HYDROCHLORIDE 50 MG/ML
12.5 INJECTION INTRAMUSCULAR; INTRAVENOUS AS NEEDED
Status: DISCONTINUED | OUTPATIENT
Start: 2019-03-04 | End: 2019-03-04 | Stop reason: HOSPADM

## 2019-03-04 RX ORDER — HYDROMORPHONE HYDROCHLORIDE 1 MG/ML
0.4 INJECTION, SOLUTION INTRAMUSCULAR; INTRAVENOUS; SUBCUTANEOUS EVERY 5 MIN PRN
Status: DISCONTINUED | OUTPATIENT
Start: 2019-03-04 | End: 2019-03-04 | Stop reason: HOSPADM

## 2019-03-04 RX ORDER — DEXAMETHASONE SODIUM PHOSPHATE 4 MG/ML
4 VIAL (ML) INJECTION AS NEEDED
Status: DISCONTINUED | OUTPATIENT
Start: 2019-03-04 | End: 2019-03-04 | Stop reason: HOSPADM

## 2019-03-04 RX ORDER — ONDANSETRON 2 MG/ML
4 INJECTION INTRAMUSCULAR; INTRAVENOUS AS NEEDED
Status: DISCONTINUED | OUTPATIENT
Start: 2019-03-04 | End: 2019-03-04 | Stop reason: HOSPADM

## 2019-03-04 RX ORDER — HYDROCODONE BITARTRATE AND ACETAMINOPHEN 5; 325 MG/1; MG/1
2 TABLET ORAL AS NEEDED
Status: DISCONTINUED | OUTPATIENT
Start: 2019-03-04 | End: 2019-03-04 | Stop reason: HOSPADM

## 2019-03-04 RX ORDER — LABETALOL HYDROCHLORIDE 5 MG/ML
5 INJECTION, SOLUTION INTRAVENOUS EVERY 5 MIN PRN
Status: DISCONTINUED | OUTPATIENT
Start: 2019-03-04 | End: 2019-03-04 | Stop reason: HOSPADM

## 2019-03-04 RX ORDER — MIDAZOLAM HYDROCHLORIDE 1 MG/ML
1 INJECTION INTRAMUSCULAR; INTRAVENOUS EVERY 5 MIN PRN
Status: DISCONTINUED | OUTPATIENT
Start: 2019-03-04 | End: 2019-03-04 | Stop reason: HOSPADM

## 2019-03-04 NOTE — ANESTHESIA PREPROCEDURE EVALUATION
PRE-OP EVALUATION    Patient Name: Anshul Coreas    Pre-op Diagnosis: EUS/ERCP    Procedure(s):  ENDOSCOPIC ULTRASOUND, ENDOSCOPIC RETGROGRADE CHOLANGIOPANCREATOGRAPHY      Surgeon(s) and Role:     Shannon Hines MD - Primary    Pre-op vitals re injection 4 mg 4 mg Intravenous Q2H PRN   ondansetron HCl (ZOFRAN) injection 4 mg 4 mg Intravenous Q6H PRN   Metoclopramide HCl (REGLAN) injection 10 mg 10 mg Intravenous Q8H PRN   Prochlorperazine Edisylate (COMPAZINE) injection 5 mg 5 mg Intravenous Q6H NEEDLE LOCALIZATION W/ SPECIMEN 1 SITE LEFT  2013   • OTHER SURGICAL HISTORY  2011    Removal of Mass Left breast   • OTHER SURGICAL HISTORY  2009    Hysteroscopy   • REMOVAL GALLBLADDER     • TONSILLECTOMY Bilateral 1974   • US BREAST BIOPSY WITH CLIP 1 S Understanding expressed as well as a wish to proceed.     Plan/risks discussed with: patient and Other                Present on Admission:  **None**

## 2019-03-04 NOTE — PROGRESS NOTES
[facility] Hospitalist Progress Note     PCP: La Jacobo MD    Chief Complaint: follow-up    Overnight/Interim Events:      SUBJECTIVE:  Pt laying in bed. Having HA/migraine, takes meds at home. Toradol helps.      OBJECTIVE:  Temp:  [98 °F (36.7 °C)-99.1 °F (37 ondansetron HCl, acetaminophen **OR** HYDROcodone-acetaminophen **OR** HYDROcodone-acetaminophen, morphINE sulfate **OR** morphINE sulfate **OR** morphINE sulfate, ondansetron HCl, Metoclopramide HCl, Prochlorperazine Edisylate       Assessment/Plan:

## 2019-03-04 NOTE — PAYOR COMM NOTE
--------------  ADMISSION REVIEW     Payor: [facility] APOORVA/JEANCARLOS  Subscriber #:  [id number]  Authorization Number: [id number]    Admit date: 3/2/19  Admit time: [address]       Admitting Physician: Shana Lesches, MD  Attending Physician:  Kade Sawant 03/02/19 0349 126/89   Pulse 03/02/19 0349 92   Resp 03/02/19 0349 20   Temp 03/02/19 0349 97.6 °F (36.4 °C)   Temp src 03/02/19 0349 Tympanic   SpO2 03/02/19 0349 97 %   O2 Device 03/02/19 0436 None (Room air)       Current:/77   Pulse 81   Temp 97. DIFFERENTIAL[[id number]]                              Final result                 Please view results for these tests on the individual orders.    POCT PREGNANCY, URINE   URINE CULTURE, ROUTINE   CBC W/ DIFFERENTIAL     EKG    Rate, intervals and axes as no with the [facility] hospitalist physician. Discussed all results and plan with the patient and family.   Admission disposition: 3/2/2019  5:18 AM                 Disposition and Plan     Clinical Impression:  Abdominal pain, epigastric  (primary encounter diagnos LEFT  2013   • OTHER SURGICAL HISTORY  2011    Removal of Mass Left breast   • OTHER SURGICAL HISTORY  2009    Hysteroscopy   • REMOVAL GALLBLADDER     • TONSILLECTOMY Bilateral 1974   • US BREAST BIOPSY WITH CLIP 1 SITE LEFT  2013          [START ON 3/3/2 signs of obstruction. 2.  Moderate to large amount of stool within the right hemicolon.     Us Gallbladder (cpt=76705)    Result Date: 2/7/2019  DATE OF SERVICE: 02.07.2019 US GALLBLADDER (RIGHT UPPER QUADRANT ULTRASOUND) CLINICAL INFORMATION:  Upper abdomi enlargement or focal lesion. KIDNEYS:  No mass, obstruction, or calcification. ADRENALS:  No mass or enlargement. AORTA/VASCULAR:  No aneurysm or dissection. RETROPERITONEUM:  No mass or adenopathy.   BOWEL/MESENTERY:  No visible mass, obstruction, or b agent.  3-D RENDERING:  Three dimensional image processing was completed using a separate workstation under concurrent supervision. Images were archived. PATIENT STATED HISTORY:(As transcribed by Technologist)  Nausea, vomitting with abdominal pain.    CON SPECT images were taken with concurrent CT scan for both anatomical localization as well as attenuation correction. Scan was reformatted into multi-planar reconstructions on a dedicated workstation. RADIOPHARMACEUTICAL(S):  7.2 mCi Tc-99m mebrofenin.   Micah Shoemaker [facility] hospitalist to continue to follow patient while in house.  D/w RN Laura Torres and pt//mother-in-law/dtr    Tesha Montes MD  [facility]  Pager: [phone]  3/2/2019  4:37 PM            Electronically signed by Emmie Wall MD on for 3 weeks. .  Pain is sharp, related to meals and does radiate. CCX 3 weeks ago, dilated CBD on MRCP no sones. Recent CT 3 cm GB fossa fluid collection, no leak on PIPIDA, no stones on repeat MRCP duct still dilated. Now with acute AST ALT elevation.

## 2019-03-04 NOTE — PROGRESS NOTES
3/4/19 Pt left unit for EUS/ ERCP at this time in stable condition. A+ox3. RA. Strict NPO. Pt voided prior to leaving unit. Pt left unit with patent IV site with IVF infusing. Previously signed consent remains in chart. Family at bedside.  All questions ans

## 2019-03-04 NOTE — PROGRESS NOTES
[facility]  Progress Note    Romeliaeric Low Patient Status:  Inpatient    1970 MRN [id number]   [facility] 3NW-A Attending Nafisa Lindsey MD   [facility] Day # 2 PCP Ernestina Bean MD     Subjective:    Patient reports epigastric

## 2019-03-05 VITALS
DIASTOLIC BLOOD PRESSURE: 64 MMHG | SYSTOLIC BLOOD PRESSURE: 115 MMHG | TEMPERATURE: 99 F | HEART RATE: 69 BPM | RESPIRATION RATE: 18 BRPM | OXYGEN SATURATION: 97 % | WEIGHT: 208 LBS | BODY MASS INDEX: 29.78 KG/M2 | HEIGHT: 70 IN

## 2019-03-05 LAB
ALBUMIN SERPL-MCNC: 3.1 G/DL (ref 3.4–5)
ALP LIVER SERPL-CCNC: 106 U/L (ref 39–100)
ALT SERPL-CCNC: 98 U/L (ref 13–56)
AST SERPL-CCNC: 50 U/L (ref 15–37)
BILIRUB DIRECT SERPL-MCNC: 0.2 MG/DL (ref 0–0.2)
BILIRUB SERPL-MCNC: 0.7 MG/DL (ref 0.1–2)
M PROTEIN MFR SERPL ELPH: 6.1 G/DL (ref 6.4–8.2)
POTASSIUM SERPL-SCNC: 3.8 MMOL/L (ref 3.5–5.1)

## 2019-03-05 PROCEDURE — 84132 ASSAY OF SERUM POTASSIUM: CPT | Performed by: INTERNAL MEDICINE

## 2019-03-05 PROCEDURE — 80076 HEPATIC FUNCTION PANEL: CPT | Performed by: INTERNAL MEDICINE

## 2019-03-05 PROCEDURE — C9113 INJ PANTOPRAZOLE SODIUM, VIA: HCPCS | Performed by: SURGERY

## 2019-03-05 RX ORDER — PANTOPRAZOLE SODIUM 40 MG/1
40 TABLET, DELAYED RELEASE ORAL DAILY
Qty: 30 TABLET | Refills: 0 | Status: SHIPPED | OUTPATIENT
Start: 2019-03-05 | End: 2019-04-05

## 2019-03-05 RX ORDER — POTASSIUM CHLORIDE 20 MEQ/1
40 TABLET, EXTENDED RELEASE ORAL ONCE
Status: COMPLETED | OUTPATIENT
Start: 2019-03-05 | End: 2019-03-05

## 2019-03-05 NOTE — ANESTHESIA POSTPROCEDURE EVALUATION
[facility][1] Patient Status:  Inpatient   Age/Gender 51year old female MRN [id number]   Location [facility]. Attending Jake Hui MD   [facility] Day # 2 PCP Kiran Morse MD       Anesthesia Post-op Note    Proced

## 2019-03-05 NOTE — OPERATIVE REPORT
OPERATIVE REPORT   PATIENT NAME: Sagrario Chavarria  MRN: [id number]  DATE OF OPERATION: 3/2/2019 - 3/4/2019  PREOPERATIVE DIAGNOSIS:   1. Patient status post cholecystectomy approximately a month ago for gangrenous gallbladder.   She presented with new ons removed. IMPRESSION:  1. Findings described above  RECOMMENDATIONS:  1. Clear liquid diet advanced tolerated  2. Continue to monitor liver enzymes. 3. PPI once daily.   (Patient is complaining of GERD-like symptoms with nausea and heartburn)  Antonette Schulte

## 2019-03-05 NOTE — PAYOR COMM NOTE
--------------  CONTINUED STAY REVIEW    Payor: Joe GUERIN/JEANCARLOS  Subscriber #:  [id number]  Authorization Number: [id number]    Admit date: 3/2/19  Admit time: [address]    Admitting Physician: Nahid Lynn MD  Attending Physician:  Anish Rodrigues obvious stone sludge seen. No obvious evidence of common bile duct wall thickening. The major papilla appeared unremarkable endoscopically and endosonographically. The main pancreatic duct that appeared to be dilated. The scope was then removed.   Harpal Jarrett

## 2019-03-05 NOTE — PAYOR COMM NOTE
--------------  CONTINUED STAY REVIEW    Payor: [employer]/JEANCARLOS  Subscriber #:  [id number]  Authorization Number: [id number]    Admit date: 3/2/19  Admit time: [address]    Admitting Physician: Roxane Guallpa MD  Attending Physician:  Tasneem Abdullahi

## 2019-03-05 NOTE — PROGRESS NOTES
[facility]  Progress Note    Dre Iyer Patient Status:  Inpatient    1970 MRN [id number]   [facility] 3NW-A Attending Nahid Novoa MD   [facility] Day # 3 PCP Henry Cerda MD     Subjective: Tolerating general diet.   Pa LAPAROSCOPIC CHOLECYSTECTOMY N/A 2/8/2019    Performed by Michaela Holter, MD at [facility] MAIN OR   • JIE BIOPSY STEREOTACTIC [facility] 1 SITE LEFT  2013   • JIE NEEDLE LOCALIZATION W/ SPECIMEN 1 SITE LEFT  2013   • OTHER SURGICAL HISTORY  2011    Removal of Mass Left week - ordered at [facility]. 4. Follow up with GI as needed.     Scott Mccollum  3/5/2019  3:39 PM

## 2019-03-05 NOTE — DISCHARGE SUMMARY
[facility] Internal Medicine Discharge Summary   Patient ID:  Caitlyn Meade  [id number]  66 year old  5/8/1970    Admit date: 3/2/2019    Discharge date and time: 3/5/2019     Attending Physician: Elsa rTimble MD     Primary Care Physician: Jon Campoverde improved     HA/migraine  -has migraine hx  -tylenol prn, toradol post procedure helped     Proph  -per srugery; ambulate     Dispo: dc home if tolerates food    Day of discharge Exam    03/05/19  1200   BP: 115/64   Pulse: 69   Resp: 18   Temp: 98.5 °F (3 VIEWS(CPT=74021) CLINICAL HISTORY:  Epigastric pain. COMPARISON: None TECHNIQUE: Upright and supine views of the abdomen FINDINGS: No free intraperitoneal air is identified. The bowel gas pattern is nonspecific with no overt signs of obstruction.  There image processing was completed using a separate workstation under concurrent supervision. Images were archived. PATIENT STATED HISTORY:(As transcribed by Technologist)  Epigastric abdominal pain.     FINDINGS:   Again demonstrated is a fluid collection in mike 3 weeks ago   CONTRAST USED:  100cc of Omnipaque 350  FINDINGS:  LIVER:  Mild fatty changes within the liver. BILIARY:  Status post cholecystectomy. Adjacent to surgical bed and clips is a well-defined 3.2 cm fluid collection.   Common bile duct is single shot fast spin echo, chemical shift imaging, breath hold T2 weighted images and 2-D fiesta sequences were acquired.  Fluid sensitive imaging with MRCP reconstruction was also performed including 3D multi-projection imaging (non independent workstatio 11:09     Approved by: MD Nicolas Benitez Gb Hepatobiliary Spect/ct (cpt=78226/58202)    Result Date: 3/2/2019  PROCEDURE:  NM GB HEPATOBILIARY SPECT/CT (CPT=78226/01798)   COMPARISON:  None.   INDICATIONS:  abdominal pain, cholecystectomy Hospitalist  [phone]  3/5/2019  3:14 PM

## 2019-03-05 NOTE — PLAN OF CARE
Pt is a&o x4. VSS and afebrile. O2 sats WNL on room air. Lung sounds clear bilaterally. C/o headache/migraine pain, offering pain meds as needed per [facility]. Abdomen is soft, nondistended and nontender. Bowel sounds active x4. Denies passing flatus.  Tolerated

## 2019-03-05 NOTE — PROGRESS NOTES
[facility]  Progress Note    Susie Lewis Patient Status:  Inpatient    1970 MRN [id number]   [facility] 3NW-A Attending Delon Rudolph MD   [facility] Day # 3 PCP Marylin Webb MD     Subjective:    Patient underwent EUS yest

## 2019-04-03 PROCEDURE — 88173 CYTOPATH EVAL FNA REPORT: CPT | Performed by: SURGERY

## 2019-04-30 ENCOUNTER — ANESTHESIA EVENT (OUTPATIENT)
Dept: SURGERY | Facility: HOSPITAL | Age: 49
End: 2019-04-30
Payer: COMMERCIAL

## 2019-04-30 ENCOUNTER — ANESTHESIA (OUTPATIENT)
Dept: SURGERY | Facility: HOSPITAL | Age: 49
End: 2019-04-30
Payer: COMMERCIAL

## 2019-04-30 ENCOUNTER — HOSPITAL ENCOUNTER (OUTPATIENT)
Facility: HOSPITAL | Age: 49
Discharge: HOME | End: 2019-05-01
Attending: SURGERY | Admitting: SURGERY
Payer: COMMERCIAL

## 2019-04-30 DIAGNOSIS — E04.2 MULTINODULAR GOITER: ICD-10-CM

## 2019-04-30 PROCEDURE — 88333 PATH CONSLTJ SURG CYTO XM 1: CPT | Performed by: SURGERY

## 2019-04-30 PROCEDURE — 88307 TISSUE EXAM BY PATHOLOGIST: CPT | Performed by: SURGERY

## 2019-04-30 PROCEDURE — 0GTK0ZZ RESECTION OF THYROID GLAND, OPEN APPROACH: ICD-10-PCS | Performed by: SURGERY

## 2019-04-30 PROCEDURE — 88331 PATH CONSLTJ SURG 1 BLK 1SPC: CPT | Performed by: SURGERY

## 2019-04-30 PROCEDURE — 4A11X4G MONITORING OF PERIPHERAL NERVOUS ELECTRICAL ACTIVITY, INTRAOPERATIVE, EXTERNAL APPROACH: ICD-10-PCS | Performed by: SURGERY

## 2019-04-30 PROCEDURE — 88341 IMHCHEM/IMCYTCHM EA ADD ANTB: CPT | Performed by: SURGERY

## 2019-04-30 PROCEDURE — 88321 CONSLTJ&REPRT SLD PREP ELSWR: CPT | Performed by: SURGERY

## 2019-04-30 PROCEDURE — 81025 URINE PREGNANCY TEST: CPT | Performed by: SURGERY

## 2019-04-30 PROCEDURE — 88342 IMHCHEM/IMCYTCHM 1ST ANTB: CPT | Performed by: SURGERY

## 2019-04-30 PROCEDURE — 0GBJ0ZZ EXCISION OF THYROID GLAND ISTHMUS, OPEN APPROACH: ICD-10-PCS | Performed by: SURGERY

## 2019-04-30 PROCEDURE — 88332 PATH CONSLTJ SURG EA ADD BLK: CPT | Performed by: SURGERY

## 2019-04-30 RX ORDER — HYDROCODONE BITARTRATE AND ACETAMINOPHEN 5; 325 MG/1; MG/1
2 TABLET ORAL EVERY 4 HOURS PRN
Status: DISCONTINUED | OUTPATIENT
Start: 2019-04-30 | End: 2019-05-01

## 2019-04-30 RX ORDER — ACETAMINOPHEN 325 MG/1
650 TABLET ORAL EVERY 4 HOURS PRN
Status: DISCONTINUED | OUTPATIENT
Start: 2019-04-30 | End: 2019-05-01

## 2019-04-30 RX ORDER — PANTOPRAZOLE SODIUM 40 MG/1
40 TABLET, DELAYED RELEASE ORAL
Status: DISCONTINUED | OUTPATIENT
Start: 2019-05-01 | End: 2019-05-01

## 2019-04-30 RX ORDER — MORPHINE SULFATE 4 MG/ML
4 INJECTION, SOLUTION INTRAMUSCULAR; INTRAVENOUS EVERY 2 HOUR PRN
Status: DISCONTINUED | OUTPATIENT
Start: 2019-04-30 | End: 2019-05-01

## 2019-04-30 RX ORDER — CALCITRIOL 0.25 UG/1
0.25 CAPSULE, LIQUID FILLED ORAL DAILY
Status: DISCONTINUED | OUTPATIENT
Start: 2019-04-30 | End: 2019-05-01

## 2019-04-30 RX ORDER — DEXAMETHASONE SODIUM PHOSPHATE 4 MG/ML
4 VIAL (ML) INJECTION AS NEEDED
Status: DISCONTINUED | OUTPATIENT
Start: 2019-04-30 | End: 2019-04-30 | Stop reason: HOSPADM

## 2019-04-30 RX ORDER — METOCLOPRAMIDE HYDROCHLORIDE 5 MG/ML
10 INJECTION INTRAMUSCULAR; INTRAVENOUS AS NEEDED
Status: DISCONTINUED | OUTPATIENT
Start: 2019-04-30 | End: 2019-04-30 | Stop reason: HOSPADM

## 2019-04-30 RX ORDER — LABETALOL HYDROCHLORIDE 5 MG/ML
5 INJECTION, SOLUTION INTRAVENOUS EVERY 5 MIN PRN
Status: DISCONTINUED | OUTPATIENT
Start: 2019-04-30 | End: 2019-04-30 | Stop reason: HOSPADM

## 2019-04-30 RX ORDER — PROPRANOLOL HYDROCHLORIDE 40 MG/1
40 TABLET ORAL EVERY EVENING
Status: DISCONTINUED | OUTPATIENT
Start: 2019-04-30 | End: 2019-05-01

## 2019-04-30 RX ORDER — METOCLOPRAMIDE HYDROCHLORIDE 5 MG/ML
10 INJECTION INTRAMUSCULAR; INTRAVENOUS EVERY 6 HOURS PRN
Status: DISCONTINUED | OUTPATIENT
Start: 2019-04-30 | End: 2019-05-01

## 2019-04-30 RX ORDER — IBUPROFEN 400 MG/1
400 TABLET ORAL EVERY 4 HOURS PRN
Status: DISCONTINUED | OUTPATIENT
Start: 2019-04-30 | End: 2019-05-01

## 2019-04-30 RX ORDER — DEXTROSE, SODIUM CHLORIDE, AND POTASSIUM CHLORIDE 5; .45; .15 G/100ML; G/100ML; G/100ML
INJECTION INTRAVENOUS CONTINUOUS
Status: DISCONTINUED | OUTPATIENT
Start: 2019-04-30 | End: 2019-05-01

## 2019-04-30 RX ORDER — HYDROCODONE BITARTRATE AND ACETAMINOPHEN 5; 325 MG/1; MG/1
2 TABLET ORAL AS NEEDED
Status: DISCONTINUED | OUTPATIENT
Start: 2019-04-30 | End: 2019-04-30 | Stop reason: HOSPADM

## 2019-04-30 RX ORDER — DIPHENHYDRAMINE HCL 25 MG
25 CAPSULE ORAL NIGHTLY PRN
Status: DISCONTINUED | OUTPATIENT
Start: 2019-04-30 | End: 2019-05-01

## 2019-04-30 RX ORDER — HYDROCODONE BITARTRATE AND ACETAMINOPHEN 5; 325 MG/1; MG/1
1 TABLET ORAL EVERY 4 HOURS PRN
Status: DISCONTINUED | OUTPATIENT
Start: 2019-04-30 | End: 2019-05-01

## 2019-04-30 RX ORDER — HYDROMORPHONE HYDROCHLORIDE 1 MG/ML
INJECTION, SOLUTION INTRAMUSCULAR; INTRAVENOUS; SUBCUTANEOUS
Status: COMPLETED
Start: 2019-04-30 | End: 2019-04-30

## 2019-04-30 RX ORDER — IBUPROFEN 600 MG/1
600 TABLET ORAL EVERY 4 HOURS PRN
Status: DISCONTINUED | OUTPATIENT
Start: 2019-04-30 | End: 2019-05-01

## 2019-04-30 RX ORDER — BUPIVACAINE HYDROCHLORIDE 5 MG/ML
INJECTION, SOLUTION EPIDURAL; INTRACAUDAL AS NEEDED
Status: DISCONTINUED | OUTPATIENT
Start: 2019-04-30 | End: 2019-04-30 | Stop reason: HOSPADM

## 2019-04-30 RX ORDER — NALOXONE HYDROCHLORIDE 0.4 MG/ML
80 INJECTION, SOLUTION INTRAMUSCULAR; INTRAVENOUS; SUBCUTANEOUS AS NEEDED
Status: DISCONTINUED | OUTPATIENT
Start: 2019-04-30 | End: 2019-04-30 | Stop reason: HOSPADM

## 2019-04-30 RX ORDER — DEXTROSE, SODIUM CHLORIDE, SODIUM LACTATE, POTASSIUM CHLORIDE, AND CALCIUM CHLORIDE 5; .6; .31; .03; .02 G/100ML; G/100ML; G/100ML; G/100ML; G/100ML
INJECTION, SOLUTION INTRAVENOUS CONTINUOUS
Status: DISCONTINUED | OUTPATIENT
Start: 2019-04-30 | End: 2019-05-01

## 2019-04-30 RX ORDER — ONDANSETRON 2 MG/ML
4 INJECTION INTRAMUSCULAR; INTRAVENOUS EVERY 6 HOURS PRN
Status: DISCONTINUED | OUTPATIENT
Start: 2019-04-30 | End: 2019-05-01

## 2019-04-30 RX ORDER — SODIUM CHLORIDE, SODIUM LACTATE, POTASSIUM CHLORIDE, CALCIUM CHLORIDE 600; 310; 30; 20 MG/100ML; MG/100ML; MG/100ML; MG/100ML
INJECTION, SOLUTION INTRAVENOUS CONTINUOUS
Status: DISCONTINUED | OUTPATIENT
Start: 2019-04-30 | End: 2019-05-01

## 2019-04-30 RX ORDER — RIZATRIPTAN BENZOATE 10 MG/1
10 TABLET, ORALLY DISINTEGRATING ORAL DAILY PRN
Status: DISCONTINUED | OUTPATIENT
Start: 2019-04-30 | End: 2019-05-01

## 2019-04-30 RX ORDER — PROPRANOLOL HYDROCHLORIDE 20 MG/1
40 TABLET ORAL EVERY EVENING
COMMUNITY
End: 2019-10-11

## 2019-04-30 RX ORDER — IBUPROFEN 200 MG
200 TABLET ORAL EVERY 4 HOURS PRN
Status: DISCONTINUED | OUTPATIENT
Start: 2019-04-30 | End: 2019-05-01

## 2019-04-30 RX ORDER — PROPRANOLOL HYDROCHLORIDE 20 MG/1
20 TABLET ORAL EVERY MORNING
COMMUNITY
End: 2019-10-11

## 2019-04-30 RX ORDER — HYDROCODONE BITARTRATE AND ACETAMINOPHEN 5; 325 MG/1; MG/1
1 TABLET ORAL AS NEEDED
Status: DISCONTINUED | OUTPATIENT
Start: 2019-04-30 | End: 2019-04-30 | Stop reason: HOSPADM

## 2019-04-30 RX ORDER — SODIUM CHLORIDE, SODIUM LACTATE, POTASSIUM CHLORIDE, CALCIUM CHLORIDE 600; 310; 30; 20 MG/100ML; MG/100ML; MG/100ML; MG/100ML
INJECTION, SOLUTION INTRAVENOUS CONTINUOUS
Status: DISCONTINUED | OUTPATIENT
Start: 2019-04-30 | End: 2019-04-30 | Stop reason: HOSPADM

## 2019-04-30 RX ORDER — MIDAZOLAM HYDROCHLORIDE 1 MG/ML
1 INJECTION INTRAMUSCULAR; INTRAVENOUS EVERY 5 MIN PRN
Status: DISCONTINUED | OUTPATIENT
Start: 2019-04-30 | End: 2019-04-30 | Stop reason: HOSPADM

## 2019-04-30 RX ORDER — MORPHINE SULFATE 4 MG/ML
2 INJECTION, SOLUTION INTRAMUSCULAR; INTRAVENOUS EVERY 2 HOUR PRN
Status: DISCONTINUED | OUTPATIENT
Start: 2019-04-30 | End: 2019-05-01

## 2019-04-30 RX ORDER — ONDANSETRON 2 MG/ML
4 INJECTION INTRAMUSCULAR; INTRAVENOUS AS NEEDED
Status: DISCONTINUED | OUTPATIENT
Start: 2019-04-30 | End: 2019-04-30 | Stop reason: HOSPADM

## 2019-04-30 RX ORDER — ACETAMINOPHEN 500 MG
1000 TABLET ORAL ONCE
Status: DISCONTINUED | OUTPATIENT
Start: 2019-04-30 | End: 2019-04-30

## 2019-04-30 RX ORDER — MORPHINE SULFATE 4 MG/ML
8 INJECTION, SOLUTION INTRAMUSCULAR; INTRAVENOUS EVERY 2 HOUR PRN
Status: DISCONTINUED | OUTPATIENT
Start: 2019-04-30 | End: 2019-05-01

## 2019-04-30 RX ORDER — DIPHENHYDRAMINE HYDROCHLORIDE 50 MG/ML
12.5 INJECTION INTRAMUSCULAR; INTRAVENOUS AS NEEDED
Status: DISCONTINUED | OUTPATIENT
Start: 2019-04-30 | End: 2019-04-30 | Stop reason: HOSPADM

## 2019-04-30 RX ORDER — HYDROMORPHONE HYDROCHLORIDE 1 MG/ML
0.4 INJECTION, SOLUTION INTRAMUSCULAR; INTRAVENOUS; SUBCUTANEOUS EVERY 5 MIN PRN
Status: DISCONTINUED | OUTPATIENT
Start: 2019-04-30 | End: 2019-04-30 | Stop reason: HOSPADM

## 2019-04-30 RX ORDER — HYDROCODONE BITARTRATE AND ACETAMINOPHEN 5; 325 MG/1; MG/1
1-2 TABLET ORAL EVERY 4 HOURS PRN
Qty: 10 TABLET | Refills: 0 | Status: SHIPPED | OUTPATIENT
Start: 2019-04-30 | End: 2019-10-11

## 2019-04-30 RX ORDER — PROPRANOLOL HYDROCHLORIDE 20 MG/1
20 TABLET ORAL EVERY MORNING
Status: DISCONTINUED | OUTPATIENT
Start: 2019-05-01 | End: 2019-05-01

## 2019-04-30 NOTE — BRIEF OP NOTE
Pre-Operative Diagnosis: Multinodular goiter [E04.2]     Post-Operative Diagnosis: Multinodular goiter [E04.2]      Procedure Performed:   TOTAL THYROIDECTOMY WITH INTRAOPERATIVE FROZEN SECTION, & NERVE MONITORING.     Surgeon(s) and Role:     * Susan Dumont

## 2019-04-30 NOTE — ANESTHESIA PREPROCEDURE EVALUATION
PRE-OP EVALUATION    Patient Name: Susie Lewis    Pre-op Diagnosis: Multinodular goiter [E04.2]    Procedure(s):  RIGHT THYROID LOBECTOMY WITH INTRAOPERATIVE FROZEN SECTION, NERVE MONITORING, POSSIBLE TOTAL THYROIDECTOMY    Surgeon(s) and Role: Procedure Laterality Date   • BENIGN BIOPSY LEFT      benign surgical excision   •      • CHOLECYSTECTOMY  2019   • ENDOSCOPIC ULTRASOUND (EUS) N/A 3/4/2019    Performed by Fabiola Ballesteros MD at [facility] ENDOSCOPY   • HYSTEROSCOPY   FB  TM distance: 4 - 6 cm  Neck ROM: full Cardiovascular    Cardiovascular exam normal.  Rhythm: regular  Rate: normal     Dental    No notable dental history. Pulmonary    Pulmonary exam normal.  Breath sounds clear to auscultation bilaterally.

## 2019-04-30 NOTE — PLAN OF CARE
Problem: Patient/Family Goals  Goal: Patient/Family Long Term Goal  Description  Patient's Long Term Goal: discharge home    Interventions:  - pain control  - tolerate diet  - neuro checks  - See additional Care Plan goals for specific interventions  Out

## 2019-04-30 NOTE — ANESTHESIA POSTPROCEDURE EVALUATION
[address] [facility] Patient Status:  Outpatient in a Bed   Age/Gender 51year old female MRN [id number]   [facility] SURGERY Attending Fabricio Conway MD   Hosp Day # 0 PCP Ann Adamson MD       Anesthesia Post-op Note    Pro

## 2019-04-30 NOTE — PROGRESS NOTES
NURSING ADMISSION NOTE      Patient admitted via Cart  Oriented to room. Safety precautions initiated. Bed in low position. Call light in reach. Patient arrived at this time from PACU. Admission database completed. Pain 4/10.  Patient complaining

## 2019-04-30 NOTE — HISTORICAL OFFICE NOTE
The above referenced H&P in the office on 4/3/2019 by Dr. Valentina Bernard was reviewed by Rama Sosa MD on 4/30/2019, the patient was examined and no significant changes have occurred in the patient's condition since the H&P was performed.   Risks and benefits were

## 2019-05-01 VITALS
TEMPERATURE: 98 F | RESPIRATION RATE: 18 BRPM | SYSTOLIC BLOOD PRESSURE: 118 MMHG | DIASTOLIC BLOOD PRESSURE: 58 MMHG | HEIGHT: 70 IN | WEIGHT: 213.38 LBS | HEART RATE: 94 BPM | OXYGEN SATURATION: 94 % | BODY MASS INDEX: 30.55 KG/M2

## 2019-05-01 PROCEDURE — 82310 ASSAY OF CALCIUM: CPT | Performed by: SURGERY

## 2019-05-01 NOTE — OPERATIVE REPORT
[facility]    PATIENT'S NAME: Phong Burroughs   ATTENDING PHYSICIAN: Ewa Jaffe M.D. OPERATING PHYSICIAN: Ewa Jaffe M.D.    PATIENT ACCOUNT#:   [de-identified]    LOCATION:  [address] [place] [address]  MEDICAL RECORD #:   [id number]       DATE OF BIRTH:  0 on the operating table. Lower extremity SCD boots were placed. After IV sedation, neuromonitoring, endotracheal tube placement, both arms were tucked at her sides and a roll was placed to the shoulder blade. Neck was slightly hyperextended.   Then, the a inferior poles were divided as well as the middle thyroid vein. As the gland was medially rotated, both parathyroid glands were identified and preserved and the nerve and artery identified.   The artery was divided and the nerve had a good signal on the mo

## 2019-05-01 NOTE — PLAN OF CARE
Problem: Patient/Family Goals  Goal: Patient/Family Long Term Goal  Description  Patient's Long Term Goal: home    Interventions:  - Early ambulation   - oral pain medication  - Encourage IS   - See additional Care Plan goals for specific interventions Saline locked per order. Norco given for pain. Ice pack to incision . HOB 30. Ambulating in the river. VS stable. Afebrile. Will continue to monitor. POC discussed.

## 2019-05-01 NOTE — PROGRESS NOTES
[facility]  Progress Note    [place] Patient Status:  Outpatient in a Bed    1970 MRN [id number]   [facility] 3NW-A Attending Sage Ambrose MD   Hosp Day # 0 PCP Eugene Bliss MD     Subjective:    Patient reports pain

## 2019-05-01 NOTE — PROGRESS NOTES
DISCHARGED TO HOME PER WHEELCHAIR AND ACCOMPANIED BY FAMILY, DISCHARGED WITH INSTRUCTIONS, ALSO RECEIVED NORCO FROM [facility] FOR HOME USE AND GIVEN INFORMATION FOR Garima Romero, PT VERBALIZES UNDERSTANDING OF INSTRUCTIONS AND MEDICATIONS

## 2019-05-01 NOTE — PLAN OF CARE
Problem: Patient/Family Goals  Goal: Patient/Family Long Term Goal  Description  Patient's Long Term Goal: home    Interventions:  - Early ambulation   - oral pain medication  - Encourage IS   - See additional Care Plan goals for specific interventions WITHOUT NAUSEA OR DIFFICULTY SWALLOWING, C/O SOME SORENESS IN THROAT AND NECK INCISION BUT STATES IS TOLERABLE AND REFUSES PAIN RX, DRESSING REMOVED FROM NECK INCISION AND INCISION INTACT WITH STERITAPES NOTED, NO DRNG NOTED, USING ICE TO INCISION, DENIES

## 2019-06-12 ENCOUNTER — TELEPHONE (OUTPATIENT)
Dept: HEMATOLOGY/ONCOLOGY | Facility: HOSPITAL | Age: 49
End: 2019-06-12

## 2019-07-27 PROBLEM — M54.12 CERVICAL RADICULOPATHY: Status: ACTIVE | Noted: 2018-08-01

## 2019-07-27 PROBLEM — R10.13 ABDOMINAL PAIN, EPIGASTRIC: Status: RESOLVED | Noted: 2019-03-02 | Resolved: 2019-07-27

## 2019-07-27 PROBLEM — N83.209 OVARIAN CYST: Status: ACTIVE | Noted: 2019-03-02

## 2019-07-27 PROBLEM — L03.032 PARONYCHIA OF GREAT TOE, LEFT: Status: RESOLVED | Noted: 2017-04-12 | Resolved: 2019-07-27

## 2019-07-29 PROBLEM — E89.0 POST-SURGICAL HYPOTHYROIDISM: Status: ACTIVE | Noted: 2019-07-29

## 2019-10-11 PROCEDURE — 86038 ANTINUCLEAR ANTIBODIES: CPT | Performed by: INTERNAL MEDICINE

## 2019-10-11 PROCEDURE — 83516 IMMUNOASSAY NONANTIBODY: CPT | Performed by: INTERNAL MEDICINE

## 2019-11-22 PROBLEM — K81.9 CHOLECYSTITIS: Status: RESOLVED | Noted: 2019-02-07 | Resolved: 2019-11-22

## 2019-12-10 PROCEDURE — 88304 TISSUE EXAM BY PATHOLOGIST: CPT | Performed by: OBSTETRICS & GYNECOLOGY

## 2020-06-05 ENCOUNTER — TELEPHONE (OUTPATIENT)
Dept: CARDIOLOGY | Age: 50
End: 2020-06-05

## 2020-12-31 PROBLEM — F43.22 ADJUSTMENT DISORDER WITH ANXIOUS MOOD: Status: ACTIVE | Noted: 2020-12-31

## 2021-01-18 PROBLEM — F33.1 MODERATE EPISODE OF RECURRENT MAJOR DEPRESSIVE DISORDER (HCC): Status: ACTIVE | Noted: 2021-01-18

## 2021-01-18 PROBLEM — F41.1 GAD (GENERALIZED ANXIETY DISORDER): Status: ACTIVE | Noted: 2021-01-18

## 2021-01-18 PROBLEM — F43.22 ADJUSTMENT DISORDER WITH ANXIETY: Status: ACTIVE | Noted: 2020-12-31

## 2021-02-11 PROBLEM — Z63.8 STRESS DUE TO FAMILY TENSION: Status: ACTIVE | Noted: 2021-02-11

## 2021-02-11 PROBLEM — F43.12 CHRONIC POST-TRAUMATIC STRESS DISORDER (PTSD): Status: ACTIVE | Noted: 2021-02-11

## 2021-02-11 PROBLEM — F51.02 ADJUSTMENT INSOMNIA: Status: ACTIVE | Noted: 2021-02-11

## 2021-03-04 PROBLEM — F43.20 ADJUSTMENT REACTION: Status: ACTIVE | Noted: 2020-12-31

## 2021-03-04 PROBLEM — G47.00 INSOMNIA: Status: ACTIVE | Noted: 2020-09-22

## 2021-03-04 PROBLEM — F41.1 GENERALIZED ANXIETY DISORDER: Status: ACTIVE | Noted: 2020-09-22

## 2021-03-04 PROBLEM — F33.1 MODERATE RECURRENT MAJOR DEPRESSION (HCC): Status: ACTIVE | Noted: 2020-09-22

## 2021-08-20 ENCOUNTER — APPOINTMENT (OUTPATIENT)
Dept: CT IMAGING | Facility: HOSPITAL | Age: 51
End: 2021-08-20
Attending: EMERGENCY MEDICINE
Payer: COMMERCIAL

## 2021-08-20 PROCEDURE — 70450 CT HEAD/BRAIN W/O DYE: CPT | Performed by: EMERGENCY MEDICINE

## 2021-08-21 NOTE — ED QUICK NOTES
Pt verbalized understanding of discharge instructions. Pt  at bedside to transport home. Pt left facility in NAD.

## 2021-08-21 NOTE — ED QUICK NOTES
Pt assisted to restroom. Pt made aware to call fos assistance for ambulation. Call light within reach.

## 2021-08-21 NOTE — ED QUICK NOTES
Pt to ED via [address] with c/o an episode of altered mental status PTA. Per EMS, pt was found unresponsive outside her car in a parking lot. Pt smells of ETOH, pt denies the use of drugs or alcohol. Pt arrives to ED A&O x4. Delayed responses noted.  Ri

## 2021-08-21 NOTE — ED PROVIDER NOTES
Patient Seen in: [facility] AND [facility] Emergency Department      History   Patient presents with:  Eval-D: found unresponsive     Stated Complaint: ETOH,     HPI/Subjective:   HPI  Patient is a 48-year-old female history of depression, anxiety, hypothyroidis Mouth: Mucous membranes are moist.   Eyes:      Extraocular Movements: Extraocular movements intact. Conjunctiva/sclera: Conjunctivae normal.      Comments: Right pupil slightly enlarged compared to left, bilateral pupils are reactive.   No nystag Notable for the following components:    Clarity Urine Hazy (*)     All other components within normal limits   DRUG ABUSE PANEL 10 SCREEN - Abnormal; Notable for the following components:    Barbiturates Urine Presumed Positive (*)     Cannabinoid Urine P Disposition and Plan     Clinical Impression:  Alcoholic intoxication without complication ([facility] [address]  (primary encounter diagnosis)     Disposition:  Discharge  8/20/2021  9:31 pm    Follow-up:  Kyra Aranda, [address] [facility] [phone]

## (undated) DEVICE — REM POLYHESIVE ADULT PATIENT RETURN ELECTRODE: Brand: VALLEYLAB

## (undated) DEVICE — FILTERLINE NASAL ADULT O2/CO2

## (undated) DEVICE — LAP CHOLE/APPY CDS-LF: Brand: MEDLINE INDUSTRIES, INC.

## (undated) DEVICE — TROCAR: Brand: KII FIOS FIRST ENTRY

## (undated) DEVICE — ABSORBABLE HEMOSTAT (OXIDIZED REGENERATED CELLULOSE, U.S.P.): Brand: SURGICEL

## (undated) DEVICE — SUTURE SILK 3-0

## (undated) DEVICE — STRYKER HARMONIC 23CM REPRCSED

## (undated) DEVICE — SUTURE VICRYL 0 UR-6

## (undated) DEVICE — 3M™ STERI-STRIP™ REINFORCED ADHESIVE SKIN CLOSURES, R1547, 1/2 IN X 4 IN (12 MM X 100 MM), 6 STRIPS/ENVELOPE: Brand: 3M™ STERI-STRIP™

## (undated) DEVICE — 1200CC GUARDIAN II: Brand: GUARDIAN

## (undated) DEVICE — GAMMEX® NON-LATEX PI TEXTURED SIZE 6.5, STERILE POLYISOPRENE POWDER-FREE SURGICAL GLOVE: Brand: GAMMEX

## (undated) DEVICE — KENDALL SCD EXPRESS SLEEVES, KNEE LENGTH, MEDIUM: Brand: KENDALL SCD

## (undated) DEVICE — SUTURE VICRYL 3-0 SH

## (undated) DEVICE — CHLORAPREP 26ML APPLICATOR

## (undated) DEVICE — 3M(TM) MICROPORE TAPE DISPENSER 1535-2: Brand: 3M™ MICROPORE™

## (undated) DEVICE — UNDYED BRAIDED (POLYGLACTIN 910), SYNTHETIC ABSORBABLE SUTURE: Brand: COATED VICRYL

## (undated) DEVICE — CHLORAPREP ORANGE TINT 10.5ML

## (undated) DEVICE — STERILE POLYISOPRENE POWDER-FREE SURGICAL GLOVES: Brand: PROTEXIS

## (undated) DEVICE — SOL  .9 1000ML BTL

## (undated) DEVICE — SUTURE SILK 2-0

## (undated) DEVICE — SOL  .9 1000ML BAG

## (undated) DEVICE — MINI LAP PACK-LF: Brand: MEDLINE INDUSTRIES, INC.

## (undated) DEVICE — BOWLS UTILITY 16OZ

## (undated) DEVICE — SYRINGE 10ML LL TIP

## (undated) DEVICE — SYRINGE 50ML LL TIP

## (undated) DEVICE — PROBE 8225101 5PK STD PRASS FL TIP ROHS

## (undated) DEVICE — ENDOPATH ULTRA VERESS INSUFFLATION NEEDLES WITH LUER LOCK CONNECTORS: Brand: ENDOPATH

## (undated) DEVICE — TROCAR: Brand: KII® SLEEVE

## (undated) DEVICE — #11 STERILE BLADE: Brand: POLYMER COATED BLADES

## (undated) DEVICE — Device: Brand: DEFENDO AIR/WATER/SUCTION AND BIOPSY VALVE

## (undated) DEVICE — GAUZE SPONGES,USP TYPE VII GAUZE, 12 PLY: Brand: CURITY

## (undated) DEVICE — CAUTERY NEEDLE 2IN INS E1465

## (undated) DEVICE — SPONGE: SPECIALTY PEANUT XR 100/CS: Brand: MEDICAL ACTION INDUSTRIES

## (undated) DEVICE — SUTURE MONOCRYL 4-0 PS-2

## (undated) DEVICE — "MH-438 A/W VLVE F/140 EVIS-140": Brand: AIR/WATER VALVE

## (undated) DEVICE — TROCAR: Brand: KII SHIELDED BLADED ACCESS SYSTEM

## (undated) DEVICE — 3M™ RED DOT™ MONITORING ELECTRODE WITH FOAM TAPE AND STICKY GEL, 50/BAG, 20/CASE, 72/PLT 2570: Brand: RED DOT™

## (undated) DEVICE — ENDOSCOPY PACK UPPER: Brand: MEDLINE INDUSTRIES, INC.

## (undated) DEVICE — GOWN,SIRUS,FABRIC-REINFORCED,LARGE: Brand: MEDLINE

## (undated) DEVICE — EMG TUBE 8229706 NIM TRIVANTAGE 6.0MM ID: Brand: NIM TRIVANTAGE™

## (undated) DEVICE — LIGACLIP EXTRA LIGATING CLIP CARTRIDGES: 6 TITANIUM CLIPS/ CARTRIDGE (SMALL): Brand: LIGACLIP

## (undated) DEVICE — DISPOSABLE LAPAROSCOPIC CLIP APPLIER WITH 20 CLIPS.: Brand: EPIX® UNIVERSAL CLIP APPLIER

## (undated) NOTE — LETTER
Ashley Frederick [address] [address]  [place], [address] [facility]    Consent for Operation  Date: __________________                                Time: _______________    1.  I authorize the performance upon Anshul Coreas the following operation:  Endo procedure has been videotaped, the surgeon will obtain the original videotape. The hospital will not be responsible for storage or maintenance of this tape.   6. For the purpose of advancing medical education, I consent to the admittance of observers to the STATEMENTS REQUIRING INSERTION OR COMPLETION WERE FILLED IN.     Signature of Patient:   ___________________________    When the patient is a minor or mentally incompetent to give consent:  Signature of person authorized to consent for patient: ____________ supplements, and pills I can buy without a prescription (including street drugs/illegal medications). Failure to inform my anesthesiologist about these medicines may increase my risk of anesthetic complications. iv.  If I am allergic to anything or have ha Anesthesiologist Signature     Date   Time  I have discussed the procedure and information above with the patient (or patient’s representative) and answered their questions. The patient or their representative has agreed to have anesthesia services.     ___

## (undated) NOTE — LETTER
Ashley Frederick [address] [address]  [place], [address] [place] [address]    Consent for Operation  Date: __________________                                Time: _______________    1.  I authorize the performance upon Janelle Mane the following operation:  Proced procedure has been videotaped, the surgeon will obtain the original videotape. The hospital will not be responsible for storage or maintenance of this tape.   7. For the purpose of advancing medical education, I consent to the admittance of observers to the STATEMENTS REQUIRING INSERTION OR COMPLETION WERE FILLED IN.     Signature of Patient:   ___________________________    When the patient is a minor or mentally incompetent to give consent:  Signature of person authorized to consent for patient: ____________ supplements, and pills I can buy without a prescription (including street drugs/illegal medications). Failure to inform my anesthesiologist about these medicines may increase my risk of anesthetic complications. iv.  If I am allergic to anything or have ha Anesthesiologist Signature     Date   Time  I have discussed the procedure and information above with the patient (or patient’s representative) and answered their questions. The patient or their representative has agreed to have anesthesia services.     ___

## (undated) NOTE — LETTER
Ashley Frederick [address] [address]  [place], [address] [facility]    Consent for Operation  Date: _____February 8, 2019_________                                Time: _____0910_______    1.  I authorize the performance upon Elizabeth Deutsch the following opera procedure has been videotaped, the surgeon will obtain the original videotape. The hospital will not be responsible for storage or maintenance of this tape.   7. For the purpose of advancing medical education, I consent to the admittance of observers to the STATEMENTS REQUIRING INSERTION OR COMPLETION WERE FILLED IN.     Signature of Patient:   ___________________________    When the patient is a minor or mentally incompetent to give consent:  Signature of person authorized to consent for patient: ____________ supplements, and pills I can buy without a prescription (including street drugs/illegal medications). Failure to inform my anesthesiologist about these medicines may increase my risk of anesthetic complications. iv.  If I am allergic to anything or have ha Anesthesiologist Signature     Date   Time  I have discussed the procedure and information above with the patient (or patient’s representative) and answered their questions. The patient or their representative has agreed to have anesthesia services.     ___